# Patient Record
Sex: FEMALE | Race: WHITE
[De-identification: names, ages, dates, MRNs, and addresses within clinical notes are randomized per-mention and may not be internally consistent; named-entity substitution may affect disease eponyms.]

---

## 2019-07-21 NOTE — HP
CIWA Score


Nausea/Vomitin-Mild Nausea/No Vomiting


Muscle Tremors: 4-Moderate,w/Arms Extend


Anxiety: 3


Agitation: 4-Moderately Restless


Paroxysmal Sweats: 2


Orientation: 0-Oriented


Tacttile Disturbances: 0-None


Auditory Disturbances: 0-None


Visual Disturbances: 0-None


Headache: 2-Mild


CIWA-Ar Total Score: 16





- Admission Criteria


[facility] Guidelines: Admission for Medically Managed Detox: 


Requires at least one of the followin. CIWA greater than 12


2. Seizures within the past 24 hours


3. Delirium tremens within the past 24 hours


4. Hallucinations within the past 24 hours


5. Acute intervention needed for co  occurring medical disorder


6. Acute intervention needed for co  occurring psychiatric disorder


7. Severe withdrawal that cannot be handled at a lower level of care (continued


    vomiting, continued diarrhea, abnormal vital signs) requiring intravenous


    medication and/or fluids


8. Pregnancy








Admission ROS [facility]





- [facility]


Chief Complaint: 





Alcohol withdrawal symptoms


Allergies/Adverse Reactions: 


 Allergies











Allergy/AdvReac Type Severity Reaction Status Date / Time


 


amoxicillin Allergy Severe  Verified 19 20:19











History of Present Illness: 





33 years old female with a long history of alcohol dependence is seeking 

admission to detox. Patient has been in previous detox at [facility] and reports 7 years of sobriety. She has history of seizure and 

depression.  She denies suicide attempt / suicidal ideation at this time. this 

her first admission to [facility] detox.  


Exam Limitations: No Limitations





- Ebola screening


Have you traveled outside of the country in the last 21 days: No (N)


Have you had contact with anyone from an Ebola affected area: No


Do you have a fever: No





- Review of Systems


Constitutional: Chills, Malaise


EENT: reports: Nose Congestion


Respiratory: reports: No Symptoms reported


Cardiac: reports: No Symptoms Reported


GI: reports: Poor Appetite, Poor Fluid Intake, Abdominal cramping


: reports: No Symptoms Reported


Musculoskeletal: reports: Back Pain


Integumentary: reports: Dryness, Flushing


Neuro: reports: Headache, Tremors


Endocrine: reports: No Symptoms Reported


Hematology: reports: No Symptoms Reported


Psychiatric: reports: Mood/Affect Appropiate, Orientated x3, Anxious, Depressed


Other Systems: Reviewed and Negative





Patient History





- Patient Medical History


Hx Anemia: No


Hx Asthma: No


Hx Chronic Obstructive Pulmonary Disease (COPD): No


Hx Cancer: No


Hx Cardiac Disorders: No


Hx Congestive Heart Failure: No


Hx Hypertension: No


Hx Hypercholesterolemia: No


Hx Pacemaker: No


HX Cerebrovascular Accident: No


Hx Seizures: Yes (Not on medication)


Hx Dementia: No


Hx Diabetes: No


Hx Gastrointestinal Disorders: No


Hx Liver Disease: No


Hx Genitourinary Disorders: No


Hx Sexually Transmitted Disorders: No


Hx Renal Disease (ESRD): No


Hx Thyroid Disease: No


Hx Human Immunodeficiency Virus (HIV): No (Neggative 2019)


Hx Hepatitis C: No


Hx Depression: Yes (Effexor, Bupriopion)


Hx Suicide Attempt: No (Denies suicide attempt/ ze1merfav ideation at this time)


Hx Bipolar Disorder: No


Hx Schizophrenia: No





- Patient Surgical History


Past Surgical History: No





- PPD History


Previous Implant?: Yes


Documented Results: Negative w/o proof


Implanted On Prior [facility] Admission?: No


PPD to be Administered?: Yes





- Reproductive History


Patient is a Female of Child Bearing Age (11 -55 yrs old): Yes


Last Menstrual Period: 19


Patient Pregnant: No





- Smoking Cessation


Smoking history: Never smoked


Have you smoked in the past 12 months: No


Hx Chewing Tobacco Use: No


Initiated information on smoking cessation: No





- Substance & Tx. History


Hx Alcohol Use: Yes


Hx Substance Use: No


Substance Use Type: Alcohol


Hx Substance Use Treatment: No





- Substances abused


  ** Alcohol


Substance route: Oral


Frequency: Daily


Amount used: 1 pint


Age of first use: 14


Date of last use: 19





Family Disease History





- Family Disease History


Family Disease History: Other: Mother (Alcoholic)





Admission Physical Exam [facility]





- Vital Signs


Vital Signs: 


 Vital Signs - 24 hr











  19





  20:19


 


Temperature 98.6 F


 


Pulse Rate 117 H


 


Respiratory 20





Rate 


 


Blood Pressure 128/87














- Physical


General Appearance: Yes: Moderate Distress, Anxious


HEENTM: Yes: Within Normal Limits, Normal ENT Inspection, Normal Voice


Respiratory: Yes: Normal Breath Sounds


Neck: Yes: Supple


Breast: Yes: Breast Exam Deferred


Cardiology: Yes: Regular Rhythm, Regular Rate


Abdominal: Yes: Normal Bowel Sounds


Genitourinary: Yes: Within Normal Limits


Back: Yes: Normal Inspection


Musculoskeletal: Yes: Within Normal Limits


Extremities: Yes: Normal Inspection


Neurological: Yes: CNs II-XII NML intact


Integumentary: Yes: Warm


Lymphatic: Yes: Within Normal Limits





- Diagnostic


(1) Alcohol dependence with uncomplicated withdrawal


Current Visit: Yes   Status: Acute   





(2) Depression


Current Visit: Yes   Status: Acute   


Qualifiers: 


   Depression Type: unspecified   Qualified Code(s): F32.9 - Major depressive 

disorder, single episode, unspecified   





Cleared for Admission [facility]





- Detox or Rehab


[facility] Level of Care: Medically Managed


Detox Regimen/Protocol: Librium





Breathalyzer





- Breathalyzer


Breathalyzer: 0.105





Urine Drug Screen





- Test Device


Lot number: [id number]


Expiration date: 21





- Control


Is test valid?: Yes





- Results


Drug screen NEGATIVE: No


Urine drug screen results: BZO-Benzodiazepines





Inpatient Rehab Admission





- Rehab Decision to Admit


Inpatient rehab admission?: No

## 2019-07-21 NOTE — PDOC
History of Present Illness





- General


Chief Complaint: Alcohol intoxication


Stated Complaint: SUBSTANCE ABUSE


Time Seen by Provider: 07/21/19 16:48


History Source: Patient


Exam Limitations: No Limitations





- History of Present Illness


Initial Comments: 





07/21/19 17:25





39 yo F pmh etoh withdrawal (DT, seizures) BIBEMS for alcohol intoxication. Pt 

called EMS after binge drinking for the weekend. Pt is a poor historian but 

believes she has had about 20 drinks. Last drink at 3pm. Pt states she normally 

drinks the same amount per day. Denies concominant drug use (e.g. barbituates, 

benzos). Denies head strike, LOC, f/c/n/v, chest pain, sob. 





Chart review shows the patient recently had an MRI (indication seizures) and 

was seen for etoh detox but signed out. 





PMH: as above 


Meds: see chart 


No significant surgical hx


Amoxicillin allergy 





Does not smoke. 














Past History





- Past Medical History


Allergies/Adverse Reactions: 


 Allergies











Allergy/AdvReac Type Severity Reaction Status Date / Time


 


amoxicillin Allergy Severe  Verified 07/21/19 20:19











Home Medications: 


Ambulatory Orders





Bupropion HCl [Bupropion Xl] 450 mg PO DAILY 07/17/19 


Venlafaxine HCl ER [Effexor Xr -] 75 mg PO DAILY 07/17/19 








COPD: No


Other medical history: chr alcoholic





- Suicide/Smoking/Psychosocial Hx


Smoking History: Never smoked


Hx Alcohol Use: Yes


Drug/Substance Use Hx: No





**Review of Systems





- Review of Systems


Able to Perform ROS?: Yes


Is the patient limited English proficient: No


Constitutional: No: Chills, Fever


HEENTM: No: Symptoms Reported, See HPI, Eye Pain, Blurred Vision, Tearing, 

Recent change in vision, Double Vision, Cataracts, Ear Pain, Ocular Prothesis, 

Ear Discharge, Nose Pain, Nose Congestion, Tinnitus, Nose Bleeding, Hearing Loss

, Throat Pain, Throat Swelling, Mouth Pain, Dental Problems, Difficulty 

Swallowing, Mouth Swelling, Other


Respiratory: No: Symptoms reported, See HPI, Cough, Orthopnea, Shortness of 

Breath, SOB with Exertion, SOB at Rest, Stridor, Wheezing, Productive cough, 

Hemoptysis, Other


Cardiac (ROS): No: Symptoms Reported, See HPI, Chest Pain, Edema, Irregular 

Heart Rate, Lightheadedness, Palpitations, Syncope, Chest Tightness, Other


ABD/GI: No: Symptoms Reported, See HPI, Abdominal Distended, Abd. Pain w/ 

defecation, Blood Streaked Bowels, Constipated, Diarrhea, Difficulty Swallowing

, Nausea, Poor Appetite, Poor Fluid Intake, Rectal Bleeding, Vomiting, 

Indigestion, Abdominal cramping, Tarry Stools, Other


: No: Symptoms Reported, See HPI, Burning, Dysuria, Discharge, Frequency, 

Flank Pain, Hematuria, Incontinence, Pain, Urgency, Testicular Mass, Testicular 

Swelling, Lesions, Testicular Pain, Other


Neurological: No: Numbness, Paresthesia, Tingling, Weakness





*Physical Exam





- Vital Signs


 Last Vital Signs











Temp Pulse Resp BP Pulse Ox


 


 98.8 F   118 H  18   121/70   96 


 


 07/21/19 16:45  07/21/19 16:45  07/21/19 16:45  07/21/19 16:45  07/21/19 16:45














- Physical Exam


Comments: 





07/21/19 18:06





GEN: Resting comfortable in bed, NAD. Warm. 


HEENT: NC/AT, EOMI, PERRLA. Moist mucus membranes. CN II-XII intact. No obvious 

deformities, scalp hematomas, palpable fractures, or bruises. Normal voice. 


NEURO: Alert and oriented x3. Sensation grossly intact. 5/5 strength of UE and 

LE b/l. Normal gait, ambulates well. 


CV: S1/S2, RRR, no m/r/g


LUNG: CTAB, no wheezes, crackles, rales, rhonchi. 


GI: soft, ndnt, +BS


EXTREMITIES: There is a 10cm x 5 cm bruise on the lateral aspect of the left 

thigh. No obvious deformities of all extremities. 

















ED Treatment Course





- LABORATORY


CBC & Chemistry Diagram: 


 07/21/19 17:56





 07/21/19 17:56





Medical Decision Making





- Medical Decision Making





07/21/19 17:37





39 yo F pmh alcohol withdrawal (seizures, DTs) BIBEMS for alcohol intoxication. 

Last drink was 2 hours before presentation. Patient is completely alert and 

oriented, neurologically intact. She denies head trauma and there are no signs 

of head trauma. There are no airway or breathing issues.  Patient signed out of 

etoh detox recently but is amenable to re-admission. 





Acute alcohol intoxication


- CBC, CMP, pregnancy test


- IV, banana bag


- Urine tox


- monitor 


- [facility] has a bed for patient; call before discharge. If no bed anymore, 

need to find new detox facility ([place]?) 





dispo: [facility] 














*DC/Admit/Observation/Transfer


Diagnosis at time of Disposition: 


Alcohol intoxication


Qualifiers:


 Complication of substance-induced condition: uncomplicated Qualified Code(s): 

F10.920 - Alcohol use, unspecified with intoxication, uncomplicated








- Discharge Dispostion


Disposition: TRANSFER ACUTE CARE/OTHER HOSP


Condition at time of disposition: Good


Decision to Admit order: No





- Referrals





- Patient Instructions


Printed Discharge Instructions:  DI for Alcohol Abuse


Additional Instructions: 


You were assessed and treated in the Emergency Department. 


Please follow up with your Primary Care Physician and Detox specialist. 





Please return to the Emergency Department if you experience any of the following

: 


- chest pain 


- shortness of breath and/or difficulty breathing 


- seizures, fainting, loss of consciousness, change in behavior, and/or change 

in mentation


- severe headache


- any signs or symptoms that concern you 








- Post Discharge Activity

## 2019-07-21 NOTE — PDOC
Documentation entered by Maxime Potts SCRIBE, acting as scribe for 

Isidra Casey MD.








Isidra Casey MD:  This documentation has been prepared by the Katlyn fletcher Nirvannie, SCRIBE, under my direction and personally reviewed by me in 

its entirety.  I confirm that the documentation accurately reflects all work, 

treatment, procedures, and medical decision making performed by me.  





Attending Attestation





- Resident


Resident Name: Ramo Jara





- ED Attending Attestation


I have performed the following: I have examined & evaluated the patient, The 

case was reviewed & discussed with the resident, I agree w/resident's findings 

& plan





- HPI


HPI: 





07/21/19 17:57


The patient is a 38 year old female, with a significant past medical history of 

alcohol abuse (withdrawal seizures), who presents to the emergency department 

with, alcohol intoxication. Patient believes she had approximately 20 drinks 

this weekend (last drink 3pm). She denies recent chest pain or shortness of 

breath.





Allergies: Amoxicillin.





07/21/19 18:15


cant remember last time she was sober, denies other drug use. states things are 

safe at home denies trauma. denies thoughts of self harm. 








- Physicial Exam


PE: 





07/21/19 18:15


awake alert lungs clear bilat heart rrr no mrg abd soft nt nd ext wwp. nuero 

alert oriented x 3. moves all four ext.  speech clear. 





- Medical Decision Making





07/21/19 18:16


39 yo F with h/o etoh abuse last etoh today at 3 pm. pt willing to go to [facility] for detox. plan to transfer to [facility]. basic labs. po hydration CIWA 

precautions. 


07/21/19 19:14


pt ekg normal. would like to go to [facility]. clinically sober at this point . 

called [facility] to discuss transfer.





**Heart Score/ECG Review


  ** #1


General ECG Interpretation: Sinus Rhythm, Normal Rate (108), Normal Intervals, 

No acute ischemic changes

## 2019-07-21 NOTE — PDOC
*Physical Exam





- Vital Signs


 Last Vital Signs











Temp Pulse Resp BP Pulse Ox


 


 98.8 F   118 H  18   121/70   96 


 


 07/21/19 16:45  07/21/19 16:45  07/21/19 16:45  07/21/19 16:45  07/21/19 16:45














ED Treatment Course





- LABORATORY


CBC & Chemistry Diagram: 


 07/21/19 17:56





 07/21/19 17:56





Medical Decision Making





- Medical Decision Making





07/21/19 17:16


Patient seen and evaluated with Dr. Jara


37 y/o female with seizure disorder presents with alcohol intoxication.  

Patient states she was at home when she decided to come to the hospital


States she drinks 3 pints of vodka daily.  Denies any fall, head trauma/LOC.  

Amenable to detox.


10 point ROS is negative including no chest pain/shortness of breath/abdominal 

pain/nausea/vomiting/diarrhea/constipation/dysuria/hematuria/numbness/tingling





As per EMR patient left AMA from [facility] on 07/17/2017.  


Plan to observe until clinically sober + symptomatic treatment with IV hydration

,and d/c to [facility] pending bed availability.





07/21/19 17:51


Female alcohol detox bed available at [facility]


Will obtain baseline labs, EKG, CXR, Urine Tox for [facility] admission








*DC/Admit/Observation/Transfer


Diagnosis at time of Disposition: 


Alcohol intoxication


Qualifiers:


 Complication of substance-induced condition: uncomplicated Qualified Code(s): 

F10.920 - Alcohol use, unspecified with intoxication, uncomplicated








- Referrals





- Patient Instructions





- Post Discharge Activity

## 2019-07-22 NOTE — CONSULT
[facility] Psychiatric Consult





- Data


Date of interview: 07/22/19


Admission source: [facility] ED


Identifying data: MS Mckinney is a 38 years old single  female, 

unemployed with no source of income, homeless seeking detox treatment for 

alcohol


Substance Abuse History: Reports history of alcohol use. [facility] 

counsel[facility]'s summary for further information


Medical History: Significant for history one seizure episode. Patient claims 

that it was due to dehydration


Psychiatric History: Reports that her first psychiatric contact was 10 years 

ago when she saw a private psychatrist in [place] who started her on Prozac 

for depression. Reports that she has been the psychiatrist regularly. She is 

currently precribed Wellbutrin  mg/day and Effexor XR 75 mg/day.  Told 

writer that she last took medications a week ago. Denies previous psychiatric 

hospitalization or suicidal attempt. At present, reports feeling depressed, 

anxious and sleeping poorly


Physical/Sexual Abuse/Trauma History: Denies history of emotional, physical or 

sexual abuse as well as DV relationship


Additional Comment: Reports history of one previous arrest on charges of DWI





Mental Status Exam





- Mental Status Exam


Alert and Oriented to: Time, Place, Person


Cognitive Function: Fair


Patient Appearance: Well Groomed


Mood: Depressed, Anxious


Patient Behavior: Cooperative


Speech Pattern: Clear


Voice Loudness: Normal


Thought Process: Intact, Goal Oriented


Thought Disorder: Not Present


Hallucinations: Denies


Suicidal Ideation: Denies


Homicidal Ideation: Denies


Insight/Judgement: Poor


Sleep: Poorly


Appetite: Fair


Muscle strength/Tone: Normal


Gait/Station: Normal





Psychiatric Findings





- Problem List (Axis 1, 2,3)


(1) MDD (major depressive disorder)


Current Visit: Yes   Status: Chronic   





(2) Alcohol-induced mood disorder


Current Visit: Yes   Status: Acute   





(3) Alcohol-induced sleep disorder


Current Visit: Yes   Status: Acute   





(4) Alcohol dependence with uncomplicated withdrawal


Current Visit: Yes   Status: Acute   





(5) History of seizure


Current Visit: Yes   Status: Resolved   





- Initial Treatment Plan


Initial Treatment Plan: 1) Resume Wellbutrin  mg po daily and Effexor XR 

75 mg po daily.  2) Patient educated about relationship between medication 

especially Wellbutrin and Seizure and he was invited to initiate a conversation 

with her health care provider about that.  3)Continue inpatient detoxification

## 2019-07-22 NOTE — EKG
Test Reason : 

Blood Pressure : ***/*** mmHG

Vent. Rate : 097 BPM     Atrial Rate : 097 BPM

   P-R Int : 142 ms          QRS Dur : 074 ms

    QT Int : 322 ms       P-R-T Axes : 069 073 071 degrees

   QTc Int : 408 ms

 

NORMAL SINUS RHYTHM

NORMAL ECG

NO PREVIOUS ECGS AVAILABLE

Confirmed by YISSEL SAEED MD ([id number]) on 7/22/2019 9:25:22 AM

 

Referred By: SRIDEVI PROCTOR           Confirmed By:YISSEL SAEED MD

## 2019-07-22 NOTE — EKG
Test Reason : 

Blood Pressure : ***/*** mmHG

Vent. Rate : 108 BPM     Atrial Rate : 108 BPM

   P-R Int : 144 ms          QRS Dur : 076 ms

    QT Int : 312 ms       P-R-T Axes : 067 074 066 degrees

   QTc Int : 418 ms

 

SINUS TACHYCARDIA

CANNOT RULE OUT ANTERIOR INFARCT , AGE UNDETERMINED

ABNORMAL ECG

NO PREVIOUS ECGS AVAILABLE

Confirmed by YISSEL SAEED MD ([id number]) on 7/22/2019 9:24:57 AM

 

Referred By:             Confirmed By:YISSEL SAEED MD

## 2019-07-22 NOTE — PN
S CIWA





- CIWA Score


Nausea/Vomitin-No Nausea/No Vomiting


Muscle Tremors: 3


Anxiety: 3


Agitation: 3


Paroxysmal Sweats: 3


Orientation: 0-Oriented


Tacttile Disturbances: 0-None


Auditory Disturbances: 0-None


Visual Disturbances: 0-None


Headache: 0-None Present


CIWA-Ar Total Score: 12





[facility] Progress Note (SOAP)


Subjective: 





sweats


shakes


interrupted sleep


body aches


anxiety





Objective: 





19 11:20


 Vital Signs











Temperature  98.2 F   19 09:26


 


Pulse Rate  107 H  19 11:00


 


Respiratory Rate  16   19 09:26


 


Blood Pressure  102/58 L  19 09:26


 


O2 Sat by Pulse Oximetry (%)      








 Laboratory Tests











  19





  20:49


 


POC Urine HCG, Qual  Negative








labs pending


aaox3


ambulating


no acute distress


Assessment: 





19 11:20


withdrawal sx


Plan: 





continue with detox


increase fluids


pending labs

## 2019-07-23 NOTE — PN
[facility] CIWA





- CIWA Score


Nausea/Vomitin-No Nausea/No Vomiting


Muscle Tremors: 3


Anxiety: 3


Agitation: 3


Paroxysmal Sweats: 2


Orientation: 0-Oriented


Tacttile Disturbances: 0-None


Auditory Disturbances: 0-None


Visual Disturbances: 0-None


Headache: 0-None Present


CIWA-Ar Total Score: 11





[facility] Progress Note (SOAP)


Subjective: 





sweats


anxiety


body aches


interrupted sleep


Objective: 





19 11:44


 Vital Signs











Temperature  97.7 F   19 07:21


 


Pulse Rate  65   19 07:21


 


Respiratory Rate  16   19 07:21


 


Blood Pressure  106/68   19 07:21


 


O2 Sat by Pulse Oximetry (%)      








 Laboratory Tests











  19





  20:49 07:00 07:00


 


WBC   7.4 


 


RBC   3.88 


 


Hgb   11.3 


 


Hct   34.9 


 


MCV   89.9 


 


MCH   29.3 


 


MCHC   32.5 


 


RDW   14.4 


 


Plt Count   377  D 


 


MPV   8.5 


 


Sodium    139


 


Potassium    4.1


 


Chloride    107


 


Carbon Dioxide    27


 


Anion Gap    5 L


 


BUN    14.5


 


Creatinine    0.7


 


Est GFR (CKD-EPI)AfAm    128.28


 


Est GFR (CKD-EPI)NonAf    110.69


 


Random Glucose    89


 


Calcium    8.4 L


 


Total Bilirubin    0.3


 


AST    26


 


ALT    32


 


Alkaline Phosphatase    73


 


Total Protein    6.3 L


 


Albumin    3.3 L


 


POC Urine HCG, Qual  Negative  


 


RPR Titer   














  19





  07:00


 


WBC 


 


RBC 


 


Hgb 


 


Hct 


 


MCV 


 


MCH 


 


MCHC 


 


RDW 


 


Plt Count 


 


MPV 


 


Sodium 


 


Potassium 


 


Chloride 


 


Carbon Dioxide 


 


Anion Gap 


 


BUN 


 


Creatinine 


 


Est GFR (CKD-EPI)AfAm 


 


Est GFR (CKD-EPI)NonAf 


 


Random Glucose 


 


Calcium 


 


Total Bilirubin 


 


AST 


 


ALT 


 


Alkaline Phosphatase 


 


Total Protein 


 


Albumin 


 


POC Urine HCG, Qual 


 


RPR Titer  Nonreactive








aaox3


ambulating


no acute distress


Assessment: 





19 11:44


withdrawal sx


Plan: 





continue detox


increase fluids

## 2019-07-24 NOTE — PN
[facility] CIWA





- CIWA Score


Nausea/Vomitin-No Nausea/No Vomiting


Muscle Tremors: 2


Anxiety: 2


Agitation: 1-Slight > Activity


Paroxysmal Sweats: 2


Orientation: 0-Oriented


Tacttile Disturbances: 0-None


Auditory Disturbances: 0-None


Visual Disturbances: 0-None


Headache: 0-None Present


CIWA-Ar Total Score: 7





[facility] Progress Note (SOAP)


Subjective: 





sweats


tired


Objective: 





19 12:45


 Vital Signs











Temperature  97.7 F   19 09:24


 


Pulse Rate  80   19 09:24


 


Respiratory Rate  18   19 09:24


 


Blood Pressure  103/60   19 09:24


 


O2 Sat by Pulse Oximetry (%)      








aaox3


ambulating


no acute distress


Assessment: 





19 12:46


mild withdrawal sx


Plan: 





continue detox


increase fluids

## 2019-07-25 NOTE — PN
S CIWA





- CIWA Score


Nausea/Vomitin-No Nausea/No Vomiting


Muscle Tremors: 1-None Visible, but Felt


Anxiety: 1-Mildly Anxious


Agitation: 1-Slight > Activity


Paroxysmal Sweats: No Perspiration


Orientation: 0-Oriented


Tacttile Disturbances: 0-None


Auditory Disturbances: 0-None


Visual Disturbances: 0-None


Headache: 0-None Present


CIWA-Ar Total Score: 3





[facility] Progress Note (SOAP)


Subjective: 





little anxiety


Objective: 





19 11:22


 Vital Signs











Temperature  97.9 F   19 09:35


 


Pulse Rate  86   19 09:35


 


Respiratory Rate  18   19 09:35


 


Blood Pressure  114/69   19 09:35


 


O2 Sat by Pulse Oximetry (%)      








aaox3


ambulating


no acute distress


Assessment: 





19 11:23


mild withdrawal sx


Plan: 





continue detox


d/c in am

## 2019-07-26 NOTE — DS
[facility] Detox Discharge Summary


Admission Date: 


07/21/19





Discharge Date: 07/26/19





- History


Present History: Alcohol Dependence





- Physical Exam Results


Vital Signs: 


 Vital Signs











Temperature  97.1 F L  07/26/19 06:22


 


Pulse Rate  72   07/26/19 06:22


 


Respiratory Rate  16   07/26/19 06:22


 


Blood Pressure  101/58 L  07/26/19 06:22


 


O2 Sat by Pulse Oximetry (%)      











Pertinent Admission Physical Exam Findings: 





pt arrived in withdrawals


 Laboratory Tests











  07/21/19 07/22/19 07/22/19





  20:49 07:00 07:00


 


WBC   7.4 


 


RBC   3.88 


 


Hgb   11.3 


 


Hct   34.9 


 


MCV   89.9 


 


MCH   29.3 


 


MCHC   32.5 


 


RDW   14.4 


 


Plt Count   377  D 


 


MPV   8.5 


 


Sodium    139


 


Potassium    4.1


 


Chloride    107


 


Carbon Dioxide    27


 


Anion Gap    5 L


 


BUN    14.5


 


Creatinine    0.7


 


Est GFR (CKD-EPI)AfAm    128.28


 


Est GFR (CKD-EPI)NonAf    110.69


 


Random Glucose    89


 


Calcium    8.4 L


 


Total Bilirubin    0.3


 


AST    26


 


ALT    32


 


Alkaline Phosphatase    73


 


Total Protein    6.3 L


 


Albumin    3.3 L


 


POC Urine HCG, Qual  Negative  


 


RPR Titer   


 


TB (QFT) Incubation   


 


TB Test (QFT) Nil   


 


TB Test (QFT) Mitogen   


 


TB Test (QFT) Antigen   


 


TB Test (QFT)   


 


TB Positive Criteria   














  07/22/19 07/22/19





  07:00 07:00


 


WBC  


 


RBC  


 


Hgb  


 


Hct  


 


MCV  


 


MCH  


 


MCHC  


 


RDW  


 


Plt Count  


 


MPV  


 


Sodium  


 


Potassium  


 


Chloride  


 


Carbon Dioxide  


 


Anion Gap  


 


BUN  


 


Creatinine  


 


Est GFR (CKD-EPI)AfAm  


 


Est GFR (CKD-EPI)NonAf  


 


Random Glucose  


 


Calcium  


 


Total Bilirubin  


 


AST  


 


ALT  


 


Alkaline Phosphatase  


 


Total Protein  


 


Albumin  


 


POC Urine HCG, Qual  


 


RPR Titer  Nonreactive 


 


TB (QFT) Incubation   


 


TB Test (QFT) Nil   0.02


 


TB Test (QFT) Mitogen   >10.00


 


TB Test (QFT) Antigen   0.02


 


TB Test (QFT)   Negative


 


TB Positive Criteria   








today pt is aaox3


ambulating


no acute distress


no s/s of withdrawals





- Treatment


Hospital Course: Detox Protocol Followed, Detoxed Safely, Responded well, 

Discharged Condition Good, Rehab Referral Accepted


Patient has Accepted a Rehab Referral to: pt referred to [facility] 

inpatient rehab





- Medication


Discharge Medications: 


Ambulatory Orders





Bupropion HCl [Bupropion Xl] 450 mg PO DAILY 07/17/19 


Venlafaxine HCl ER [Effexor Xr -] 75 mg PO DAILY 07/17/19 











- Diagnosis


(1) Alcohol dependence with uncomplicated withdrawal


Current Visit: Yes   Status: Chronic   





(2) Alcohol-induced mood disorder


Current Visit: Yes   Status: Acute   





(3) Alcohol-induced sleep disorder


Current Visit: Yes   Status: Acute   





(4) Depression


Current Visit: Yes   Status: Acute   


Qualifiers: 


   Depression Type: unspecified   Qualified Code(s): F32.9 - Major depressive 

disorder, single episode, unspecified   





(5) MDD (major depressive disorder)


Current Visit: Yes   Status: Chronic   





(6) History of seizure


Current Visit: Yes   Status: Resolved   





- AMA


Did Patient Leave Against Medical Advice: No (referred to [place] inpatient rehab

)

## 2019-12-11 ENCOUNTER — HOSPITAL ENCOUNTER (INPATIENT)
Dept: HOSPITAL 74 - YASAS | Age: 39
LOS: 4 days | Discharge: HOME | End: 2019-12-15
Attending: ALLERGY & IMMUNOLOGY | Admitting: ALLERGY & IMMUNOLOGY
Payer: COMMERCIAL

## 2019-12-11 ENCOUNTER — HOSPITAL ENCOUNTER (EMERGENCY)
Dept: HOSPITAL 74 - JER | Age: 39
Discharge: ANOTHER HOSPITAL | End: 2019-12-11
Payer: COMMERCIAL

## 2019-12-11 VITALS — TEMPERATURE: 98 F | DIASTOLIC BLOOD PRESSURE: 76 MMHG | SYSTOLIC BLOOD PRESSURE: 114 MMHG | HEART RATE: 95 BPM

## 2019-12-11 VITALS — BODY MASS INDEX: 20.3 KG/M2

## 2019-12-11 VITALS — BODY MASS INDEX: 19.5 KG/M2
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
## 2019-12-11 DIAGNOSIS — F19.24: ICD-10-CM

## 2019-12-11 DIAGNOSIS — Z59.0: ICD-10-CM

## 2019-12-11 DIAGNOSIS — F32.9: ICD-10-CM

## 2019-12-11 DIAGNOSIS — Z88.8: ICD-10-CM
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
## 2019-12-11 DIAGNOSIS — Z86.69: ICD-10-CM
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
## 2019-12-11 DIAGNOSIS — F10.280: ICD-10-CM

## 2019-12-11 DIAGNOSIS — R56.9: ICD-10-CM

## 2019-12-11 DIAGNOSIS — Z04.3: Primary | ICD-10-CM

## 2019-12-11 DIAGNOSIS — H05.229: ICD-10-CM

## 2019-12-11 DIAGNOSIS — F10.10: ICD-10-CM

## 2019-12-11 DIAGNOSIS — F10.230: Primary | ICD-10-CM

## 2019-12-11 PROCEDURE — HZ2ZZZZ DETOXIFICATION SERVICES FOR SUBSTANCE ABUSE TREATMENT: ICD-10-PCS | Performed by: ALLERGY & IMMUNOLOGY

## 2019-12-11 RX ADMIN — Medication SCH MG: at 22:21

## 2019-12-11 SDOH — ECONOMIC STABILITY - HOUSING INSECURITY: HOMELESSNESS: Z59.0

## 2019-12-11 NOTE — PDOC
Documentation entered by Felipa Davis SCRIBE, acting as scribe for Vasu Hahn MD.








Vasu Hahn MD:  This documentation has been prepared by the Ryan fletcher Adrianna, SCRIBE, under my direction and personally reviewed by me in its 

entirety.  I confirm that the documentation accurately reflects all work, 

treatment, procedures, and medical decision making performed by me.  





Attending Attestation





- Resident


Resident Name: Sultana Cortés





- ED Attending Attestation


I have performed the following: I have examined & evaluated the patient, The 

case was reviewed & discussed with the resident, I agree w/resident's findings 

& plan, Exceptions are as noted





- HPI


HPI: 


The patient is a 39 year old female, with a significant PMH of EtOH abuse, who 

presents to the ED for evaluation s/p witnessed fall at [facility]. Patient 

slipped on someones vomit while at [facility], causing her to fall backwards 

and hit the back of her head on the concrete floor. She was able to ambulate 

after the fall. She endorses a mild headache while in the ED. 





Allergies: Amoxicillin 


Surgical History: None reported


Social History: EtOH abuse (last drink was this morning). No tobacco, or 

illicit drug use 





- Physicial Exam


PE: 





12/11/19 15:13


Patient is awake and alert, well-nourished, in no distress





Normocephalic, no obvious deformity, minimal soft tissue swelling to the right 

occipital area without bony crepitus


Neck is supple, no midline tenderness, no deformity, full range of motion


CTA


RRR


Pelvis is stable


Cranial nerves II through XII grossly intact; motor is five 5 x 4





- Medical Decision Making





12/11/19 15:16


39-year-old female with history of EtOH abuse presents with evidence of closed 

head injury status post fall.  Will obtain CT of head.  No indication for 

cervical spine CT.  Likely discharge.

## 2019-12-11 NOTE — FALL
Fall Exam





- Event


Witnessed fall: No


Location of Fall: [facility]


Fall from: While ambulating





- Pre-Fall


Mental Status: Alert (Unclear)


Current Medications: 





12/11/19 13:52


Unknown





- Post-Fall


Patient Outcome: Abrasion/Bruise (Swelling on L forehead and R occiput)


Treatment: Ice Pack


LOC Post-Fall: Alert, Oriented


Identify factors for HIGH RISK for Head Injury: Known to have hit head

## 2019-12-11 NOTE — PDOC
History of Present Illness





- General


Chief Complaint: Injury


Stated Complaint: 2X HEAD INJURY,INTOX


Time Seen by Provider: 12/11/19 14:21


History Source: Patient





- History of Present Illness


Initial Comments: 





12/11/19 14:37





Patient is a 39 year old female with no significant PMH who presents from [facility] detox s/p witnessed fall. Pt was checking herself into rehab after 

relapsing from 3-months sobriety last night. Last alcoholic drink was 8am this 

morning. At [facility], she slipped on someone's vomit, fell backwards, and hit 

the back of her head on the concrete. Denies LOC. No symptoms prior to fall (

denies chest pain, SOB, palpitations, lightheadedness). After fall, pt was able 

to stand right up. No vision changes, dizziness, confusion. Denies neck pain or 

tenderness. Only complaint is mild headache on back of head where she hit.











12/11/19 14:38








Past History





- Past Medical History


Allergies/Adverse Reactions: 


 Allergies











Allergy/AdvReac Type Severity Reaction Status Date / Time


 


amoxicillin Allergy Severe  Verified 12/11/19 14:00











Home Medications: 


Ambulatory Orders





Bupropion HCl [Bupropion Xl] 450 mg PO DAILY 07/17/19 


Venlafaxine HCl ER [Effexor Xr -] 75 mg PO DAILY 07/17/19 








Anemia: No


Asthma: No


Cancer: No


Cardiac Disorders: No


CVA: No


COPD: No


CHF: No


Dementia: No


Diabetes: No


GI Disorders: No


 Disorders: No


HTN: No


Hypercholesterolemia: No


Kidney Stones: No


Liver Disease: No


Psychiatric Problems: Yes (DEPERESSION)


Seizures: Yes (Not on medication)


Thyroid Disease: No





- Surgical History


Abdominal Surgery: No


Appendectomy: No


Cardiac Surgery: No


Cholecystectomy: No


Lung Surgery: No


Neurologic Surgery: No


Orthopedic Surgery: No





- Reproductive History


PID: No





- Psycho Social/Smoking Cessation Hx


Smoking History: Never smoked


Have you smoked in the past 12 months: No


Hx Alcohol Use: Yes


Drug/Substance Use Hx: No


Substance Use Type: Alcohol


Hx Substance Use Treatment: No





*Physical Exam





- Vital Signs


 Last Vital Signs











Temp Pulse Resp BP Pulse Ox


 


 98 F   95 H  18   114/76   98 


 


 12/11/19 13:55  12/11/19 13:55  12/11/19 13:55  12/11/19 13:55  12/11/19 13:55














Medical Decision Making





- Medical Decision Making





12/11/19 15:29





CT Head: no acute infarction, hemorrhage or fracture.





Pt is sober.





She is medically stable to be discharged back to [facility].  


12/11/19 15:33








Discharge





- Discharge Information


Clinical Impression/Diagnosis: 


Fall


Qualifiers:


 Encounter type: initial encounter Qualified Code(s): W19.XXXA - Unspecified 

fall, initial encounter





Condition: Stable


Disposition: HOME





- Admission


No





- Follow up/Referral





- Patient Discharge Instructions


Additional Instructions: 


You were evaluated in the ER for a fall. A CAT scan of your head did not show 

any fractures or bleeds.





You are stable to be discharged back to [facility] to complete rehab.





Please return to the ER if you experience subsequent falls, worsening headache, 

dizziness, confusion. 





- Post Discharge Activity

## 2019-12-11 NOTE — HP
CIWA Score


Nausea/Vomitin-No Nausea/No Vomiting


Muscle Tremors: 4-Moderate,w/Arms Extend


Anxiety: 4-Mod. Anxious/Guarded


Agitation: 4-Moderately Restless


Paroxysmal Sweats: 3


Orientation: 0-Oriented


Tacttile Disturbances: 0-None


Auditory Disturbances: 0-None


Visual Disturbances: 3-Moderate Sensitivity (to light)


Headache: 2-Mild


CIWA-Ar Total Score: 20





- Admission Criteria


OASAS Guidelines: Admission for Medically Managed Detox: 


Requires at least one of the followin. CIWA greater than 12


2. Seizures within the past 24 hours


3. Delirium tremens within the past 24 hours


4. Hallucinations within the past 24 hours


5. Acute intervention needed for co  occurring medical disorder


6. Acute intervention needed for co  occurring psychiatric disorder


7. Severe withdrawal that cannot be handled at a lower level of care (continued


    vomiting, continued diarrhea, abnormal vital signs) requiring intravenous


    medication and/or fluids


8. Pregnancy





Patient presents the following: CIWA greater than 12


Admission Criteria Met: Admission criteria met





Admitting History and Physical





- Past Medical History


...LMP: 19





- Smoking History


Smoking history: Never smoked


Have you smoked in the past 12 months: No





- Alcohol/Substance Use


Hx Alcohol Use: Yes





Admission ROS [facility]





- [facility]


Chief Complaint: 





SEEKING ALCOHOL DETOX


Allergies/Adverse Reactions: 


 Allergies











Allergy/AdvReac Type Severity Reaction Status Date / Time


 


amoxicillin Allergy Severe  Verified 19 14:00











History of Present Illness: 





HERE FOR ALCOHOL DETOX. CLIENT IS SELF REFERRED. KNOWN TO PROGRAM . LAST HERE 2019. CLIENT REPORTS RELAPSING 3 DAYS AGO. SHE REPORTS DRINKING ABOUT 2 PINTS 

OF VODKA TODAY. LAST INTAKE TODAY, 1PINT. SHE PRESENTS WITH WITHDRAWAL SX'S. 

SHE WAS ALSO EVALUATED AT [facility] [place] AFTER A SLIP AND FALL WITH CLOSED HEAD INJURY. 

CLIENT REPORTS FALLING BACKWARDS AND HITTING BACK OF HER HEAD. CT SCAN OF HEAD 

IS NEGATIVE CLIENT CLEAR FOR DETOX ADMISSION. CLIENT IS + FOR EYE OPENER 

SCREENING, BLACK OUTS, DENIES WITHDRAWAL SZ OR HX/O SZ. MOST RECENT CLEAN TIME 

5 MONTHS. 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








TB GOLD 2019 NEG


CXR 2019 NEG FOR PATHOLOGY


EKG NOTED 2019


Exam Limitations: No Limitations





- Ebola screening


Have you traveled outside of the country in the last 21 days: No


Have you had contact with anyone from an Ebola affected area: No


Have you been sick,other than usual withdrawal symptoms: No


Do you have a fever: No





- Review of Systems


Constitutional: Chills, Night Sweats


EENT: reports: Blurred Vision (CHRONIC)


Respiratory: reports: No Symptoms reported


Cardiac: reports: No Symptoms Reported


GI: reports: Poor Fluid Intake


: reports: No Symptoms Reported


Musculoskeletal: reports: No Symptoms Reported


Integumentary: reports: Flushing


Neuro: reports: Headache, Tremors, Dizziness


Endocrine: reports: No Symptoms Reported


Hematology: reports: No Symptoms Reported


Psychiatric: reports: Orientated x3, Anxious, Depressed (DENIES SI/HI)


Other Systems: Reviewed and Negative





Patient History





- Patient Medical History


Hx Anemia: No


Hx Asthma: No


Hx Chronic Obstructive Pulmonary Disease (COPD): No


Hx Cancer: No


Hx Cardiac Disorders: No


Hx Congestive Heart Failure: No


Hx Hypertension: No


Hx Hypercholesterolemia: No


Hx Pacemaker: No


HX Cerebrovascular Accident: No


Hx Seizures: No


Hx Dementia: No


Hx Diabetes: No


Hx Gastrointestinal Disorders: No


Hx Liver Disease: No


Hx Genitourinary Disorders: No


Hx Sexually Transmitted Disorders: No


Hx Renal Disease (ESRD): No


Hx Thyroid Disease: No


Hx Human Immunodeficiency Virus (HIV): No


Hx Hepatitis C: No


Hx Depression: Yes (Effexor, Bupriopion Has not taken in a couple of days due 

to binge drinking)


Hx Suicide Attempt: No (Denies suicide attempt/ [id number] ideation at this time)


Hx Bipolar Disorder: No


Hx Schizophrenia: No





- Patient Surgical History


Past Surgical History: No


Hx Neurologic Surgery: No


Hx Cataract Extraction: No


Hx Cardiac Surgery: No


Hx Lung Surgery: No


Hx Breast Surgery: No


Hx Breast Biopsy: No


Hx Abdominal Surgery: No


Hx Appendectomy: No


Hx Cholecystectomy: No


Hx Genitourinary Surgery: No


Hx  Section: No


Hx Orthopedic Surgery: No


Anesthesia Reaction: No





- PPD History


Previous Implant?: No


Implanted On Prior R Admission?: No


Date: 19


Results: tb gold neg


PPD to be Administered?: No





- Reproductive History


Patient is a Female of Child Bearing Age (11 -55 yrs old): Yes


Last Menstrual Period: 19


LMP comment: reg


Patient Pregnant: No (neg uhcg)





- Smoking Cessation


Smoking history: Never smoked


Have you smoked in the past 12 months: No


Hx Chewing Tobacco Use: No


Initiated information on smoking cessation: No





- Substance & Tx. History


Hx Alcohol Use: Yes


Hx Substance Use: No


Substance Use Type: Alcohol


Hx Substance Use Treatment: Yes ([facility])





- Substances abused


  ** Alcohol


Substance route: Oral


Frequency: Daily


Amount used: 1 pint OF VODKA


Age of first use: 16


Date of last use: 12/10/19





Admission Physical Exam [facility]





- Vital Signs


Vital Signs: 


 Vital Signs - 24 hr











  19





  01:00 17:40 17:45


 


Temperature 97.8 F 98.4 F 98.4 F


 


Pulse Rate 107 H 98 H 98 H


 


Respiratory 16 18 18





Rate   


 


Blood Pressure 105/68 124/78 124/78














  19





  18:18


 


Temperature 98.4 F


 


Pulse Rate 98 H


 


Respiratory 18





Rate 


 


Blood Pressure 124/78














- Physical


General Appearance: Yes: Moderate Distress, Tremorous, Sweating (flushed), 

Anxious


HEENTM: Yes: EOMI, Normocephalic, Normal Voice, CARMEN, Pharynx Normal


Respiratory: Yes: Chest Non-Tender, Lungs Clear, Normal Breath Sounds, No 

Respiratory Distress, No Accessory Muscle Use


Neck: Yes: No masses,lesions,Nodules, Supple, Trachea in good position


Breast: Yes: Breasts Symetrical


Cardiology: Yes: Regular Rhythm, Regular Rate, S1, S2


Abdominal: Yes: Normal Bowel Sounds, Non Tender, Soft


Genitourinary: Yes: Within Normal Limits


Back: Yes: Normal Inspection


Musculoskeletal: Yes: full range of Motion, Gait Steady


Extremities: Yes: Normal Range of Motion, Non-Tender, Tremors


Neurological: Yes: Fully Oriented, Alert, Motor Strength 5/5


Integumentary: Yes: Dry, Warm, Other (flushed)


Lymphatic: Yes: Within Normal Limits





- Diagnostic


(1) Alcohol-induced mood disorder


Current Visit: Yes   Status: Acute   





(2) Depression


Current Visit: Yes   Status: Chronic   


Qualifiers: 


   Depression Type: unspecified   Qualified Code(s): F32.9 - Major depressive 

disorder, single episode, unspecified   





(3) Fall


Current Visit: Yes   Status: Acute   


Qualifiers: 


   Encounter type: subsequent encounter   Qualified Code(s): W19.XXXD - 

Unspecified fall, subsequent encounter   





(4) Alcohol dependence with uncomplicated withdrawal


Current Visit: Yes   Status: Acute   





(5) Living in shelter


Current Visit: Yes   Status: Acute   





Cleared for Admission [facility]





- Detox or Rehab


[facility] Level of Care: Medically Managed (ativan)


Claeared for Rehab Admission: No





Breathalyzer





- Breathalyzer


Breathalyzer: 0.119





Urine Drug Screen





- Test Device


Lot number: [id number]


Expiration date: 21





- Control


Is test valid?: Yes





- Results


Drug screen NEGATIVE: Yes


Urine drug screen results: BZO-Benzodiazepines





Inpatient Rehab Admission





- Rehab Decision to Admit


Inpatient rehab admission?: No

## 2019-12-11 NOTE — HP
CIWA Score





- Admission Criteria


OASAS Guidelines: Admission for Medically Managed Detox: 


Requires at least one of the followin. CIWA greater than 12


2. Seizures within the past 24 hours


3. Delirium tremens within the past 24 hours


4. Hallucinations within the past 24 hours


5. Acute intervention needed for co  occurring medical disorder


6. Acute intervention needed for co  occurring psychiatric disorder


7. Severe withdrawal that cannot be handled at a lower level of care (continued


    vomiting, continued diarrhea, abnormal vital signs) requiring intravenous


    medication and/or fluids


8. Pregnancy








Admitting History and Physical





- Admission


History of Present Illness: 





Pt is a 38 yo F with PMHx of alcohol use disorder and prior delirium tremens,

depression, found outside [facility] after a fall. Pt was found on floor, per pt 

she said she fell on someone else's vomit on the floor. Pt took 1 pint of vodka 

at 8am today and was coming in for detox at [facility]. No bleeding from any 

orifice, pt alert and responsive but with sustained swelling on L frontal area 

and back area. Pt  reports falling yesterday in [facility] residential services in 

[place], where she sustained a bulge on the L frontal area and the second 

bulge posteriorly was sustained today. She has headache with pain localized to 

the front bump 2/10 and back 4/10. No seizures noted, not tongue biting, no 

fecal or urinary incontinence.


As fall was unwitnessed and pt has obvious head trauma, she will be sent to 

[facility] for CT head.








PE: 


AL- 107


BP-105/68


RR-16





General: Alcohol on breath, awake, initially on floor had to be assisted to 

stand up, no obvious bleeding from any orifice, no vomit on face or mouth, but 

vomit on clothes and floor


HEENT: Golf ball like swelling L frontal/temporal area, fluctuant, non bleeding

, tender, and R posterior swelling golfball size, fluctuant, non bleeding, no 

obvious bleeding tender,PERRL, nose ring in place,


Resp: CTA b/l


Cards: S1, S2, tachycardia


Abdomen: Soft, non tender


CNS: AAOx 3, Normal strength 5/5 globally, normal tone and reflexes, no tremors





Plan: Transfer to [facility] ED for CT head and further eval


For detox at [facility] when discharged from [facility]


 


History Source: Patient





- Past Medical History


...LMP: 19





- Smoking History


Smoking history: Never smoked


Have you smoked in the past 12 months: No





- Alcohol/Substance Use


Hx Alcohol Use: Yes





Admission [facility]





- [facility]


Allergies/Adverse Reactions: 


 Allergies











Allergy/AdvReac Type Severity Reaction Status Date / Time


 


amoxicillin Allergy Severe  Verified 19 20:19














- Ebola screening


Have you traveled outside of the country in the last 21 days: No


Have you had contact with anyone from an Ebola affected area: No





Patient History





- Patient Medical History


Hx Anemia: No


Hx Asthma: No


Hx Chronic Obstructive Pulmonary Disease (COPD): No


Hx Cancer: No


Hx Cardiac Disorders: No


Hx Congestive Heart Failure: No


Hx Hypertension: No


Hx Hypercholesterolemia: No


Hx Pacemaker: No


HX Cerebrovascular Accident: No


Hx Seizures: Yes (Not on medication)


Hx Dementia: No


Hx Diabetes: No


Hx Gastrointestinal Disorders: No


Hx Liver Disease: No


Hx Genitourinary Disorders: No


Hx Sexually Transmitted Disorders: No


Hx Renal Disease (ESRD): No


Hx Thyroid Disease: No


Hx Human Immunodeficiency Virus (HIV): No (Neggative 2019)


Hx Hepatitis C: No


Hx Depression: Yes (Effexor, Bupriopion)


Hx Suicide Attempt: No (Denies suicide attempt/ [id number] ideation at this time)


Hx Bipolar Disorder: No


Hx Schizophrenia: No





- Patient Surgical History


Past Surgical History: No


Hx Neurologic Surgery: No


Hx Cataract Extraction: No


Hx Cardiac Surgery: No


Hx Lung Surgery: No


Hx Breast Surgery: No


Hx Breast Biopsy: No


Hx Abdominal Surgery: No


Hx Appendectomy: No


Hx Cholecystectomy: No


Hx Genitourinary Surgery: No


Hx  Section: No


Hx Orthopedic Surgery: No


Anesthesia Reaction: No





- Reproductive History


Last Menstrual Period: 19





- Smoking Cessation


Smoking history: Never smoked


Have you smoked in the past 12 months: No


Hx Chewing Tobacco Use: No





- Substances abused


  ** Alcohol


Substance route: Oral


Frequency: Daily


Amount used: 1 pint


Age of first use: 14


Date of last use: 19





Screened but not Admitted





- Documentation of Visit


Level of Care Recommended at this Time: ER Evaluation/Care (S/p fall for fall 

protocol 1, requiring Head CT in [facility], pt will be evaluated for detox after d/

c from [facility])





Breathalyzer





- Breathalyzer


Breathalyzer: 0.105





Urine Drug Screen





- Test Device


Lot number: [id number]


Expiration date: 21





- Control


Is test valid?: Yes





- Results


Drug screen NEGATIVE: No


Urine drug screen results: BZO-Benzodiazepines





Inpatient Rehab Admission





- Rehab Decision to Admit


Inpatient rehab admission?: No

## 2019-12-12 LAB
ALBUMIN SERPL-MCNC: 3.3 G/DL (ref 3.4–5)
ALP SERPL-CCNC: 68 U/L (ref 45–117)
ALT SERPL-CCNC: 23 U/L (ref 13–61)
ANION GAP SERPL CALC-SCNC: 5 MMOL/L (ref 8–16)
AST SERPL-CCNC: 20 U/L (ref 15–37)
BILIRUB SERPL-MCNC: 0.7 MG/DL (ref 0.2–1)
BUN SERPL-MCNC: 15.8 MG/DL (ref 7–18)
CALCIUM SERPL-MCNC: 8.5 MG/DL (ref 8.5–10.1)
CHLORIDE SERPL-SCNC: 105 MMOL/L (ref 98–107)
CO2 SERPL-SCNC: 29 MMOL/L (ref 21–32)
CREAT SERPL-MCNC: 0.8 MG/DL (ref 0.55–1.3)
DEPRECATED RDW RBC AUTO: 18.1 % (ref 11.6–15.6)
GLUCOSE SERPL-MCNC: 89 MG/DL (ref 74–106)
HCT VFR BLD CALC: 33.7 % (ref 32.4–45.2)
HGB BLD-MCNC: 11.1 GM/DL (ref 10.7–15.3)
MCH RBC QN AUTO: 29.4 PG (ref 25.7–33.7)
MCHC RBC AUTO-ENTMCNC: 32.9 G/DL (ref 32–36)
MCV RBC: 89.5 FL (ref 80–96)
PLATELET # BLD AUTO: 302 K/MM3 (ref 134–434)
PMV BLD: 8.6 FL (ref 7.5–11.1)
POTASSIUM SERPLBLD-SCNC: 4.2 MMOL/L (ref 3.5–5.1)
PROT SERPL-MCNC: 6.1 G/DL (ref 6.4–8.2)
RBC # BLD AUTO: 3.77 M/MM3 (ref 3.6–5.2)
SODIUM SERPL-SCNC: 138 MMOL/L (ref 136–145)
WBC # BLD AUTO: 7.2 K/MM3 (ref 4–10)

## 2019-12-12 RX ADMIN — Medication SCH MG: at 22:13

## 2019-12-12 RX ADMIN — Medication SCH TAB: at 10:22

## 2019-12-12 RX ADMIN — VENLAFAXINE HYDROCHLORIDE SCH MG: 75 CAPSULE, EXTENDED RELEASE ORAL at 10:43

## 2019-12-12 NOTE — CONSULT
[facility] Psychiatric Consult





- Data


Date of interview: 12/12/19


Admission source: Self-referred


Identifying data: MS Mckinney is a 39 years old single  female, 

unemployed with no source of income, homeless seeking detox treatment for 

alcohol


Substance Abuse History: Reports history of alcohol use. Refer to [facility] 

counselor's summary for further information


Medical History: Significant for history one seizure episode. Patient claims 

that it was due to dehydration


Psychiatric History: Patient is known to writer from a recent encounter during 

an admission to this facility in July 2019. She reports that her first 

psychiatric contact was over 10 years ago when she saw a private psychatrist in 

[place] who diagnosed her with MDD and started her on Prozac. Reports that 

she has been the psychiatrist regularly since. She is currently seeing a 

psychiatrist at [facility] in [facility] and she is currently precribed Wellbutrin XL 

450 mg/day and Effexor XR 75 mg/day.  When seen by writer on 7/22/19, she was 

continued on her psychotropic medications as prescribed.  Denies previous 

psychiatric hospitalization or suicidal attempt. At present, reports feeling 

anxious


Physical/Sexual Abuse/Trauma History: Denies history of emotional, physical or 

sexual abuse as well as DV relationship


Additional Comment: Reports history of one previous arrest on charges of DWI





Mental Status Exam





- Mental Status Exam


Alert and Oriented to: Time, Place, Person


Cognitive Function: Fair


Patient Appearance: Well Groomed


Mood: Anxious


Affect: Appropriate


Patient Behavior: Cooperative


Speech Pattern: Clear


Voice Loudness: Normal


Thought Process: Intact


Hallucinations: Denies


Suicidal Ideation: Denies


Homicidal Ideation: Denies


Insight/Judgement: Poor


Sleep: Well


Appetite: Good


Muscle strength/Tone: Normal


Gait/Station: Normal





Psychiatric Findings





- Problem List (Axis 1, 2,3)


(1) MDD (major depressive disorder)


Current Visit: No   Status: Chronic   





(2) Alcohol-induced anxiety disorder


Current Visit: Yes   Status: Acute   





(3) Alcohol dependence with uncomplicated withdrawal


Current Visit: Yes   Status: Acute   





(4) History of seizure


Current Visit: No   Status: Resolved   





- Initial Treatment Plan


Initial Treatment Plan: 1) Continue Wellbutrin  mg po daily and Effexor 

XR 75 mg po daily.  2) Continue inpatient detoxification

## 2019-12-12 NOTE — PN
S CIWA





- CIWA Score


Nausea/Vomitin


Muscle Tremors: 4-Moderate,w/Arms Extend


Anxiety: 3


Agitation: 1-Slight > Activity


Paroxysmal Sweats: 2


Orientation: 1-Uncertain about Date (date of week)


Tacttile Disturbances: 0-None


Auditory Disturbances: 0-None


Visual Disturbances: 0-None


Headache: 2-Mild


CIWA-Ar Total Score: 15





BHS Progress Note (SOAP)


Subjective: 





39 years old female admitted on 19 for alcohol withdrawal sx management


fell upon admission REJI 105


ER evaluation


medically cleared returned to detox REJI 119


treating with ativan detox regimen


left eye superior orbital echymosis


denies pain denies vision alteration


ate breakfast ambulating on hallway denies dizziness





Ensure supplement


Objective: 





19 09:24


 Vital Signs











Temperature  98.1 F   19 06:35


 


Pulse Rate  85   19 06:35


 


Respiratory Rate  16   19 06:35


 


Blood Pressure  106/63   19 06:35


 


O2 Sat by Pulse Oximetry (%)      











19 09:24


lab pending


Assessment: 





19 09:24


alcohol withdrawal 


Plan: 





ativan regimen

## 2019-12-13 RX ADMIN — Medication SCH MG: at 22:29

## 2019-12-13 RX ADMIN — VENLAFAXINE HYDROCHLORIDE SCH MG: 75 CAPSULE, EXTENDED RELEASE ORAL at 10:31

## 2019-12-13 RX ADMIN — Medication SCH TAB: at 10:31

## 2019-12-13 NOTE — PN
Teaching Attending Note


Name of Resident: Meggan Machado





ATTENDING PHYSICIAN STATEMENT





I saw and evaluated the patient.


I reviewed the resident's note and discussed the case with the resident.


I agree with the resident's findings and plan as documented.








SUBJECTIVE:


Agree with resident subjective findings.





OBJECTIVE:


Agree with resident objective findings.





ASSESSMENT AND PLAN:


Agree with plan for admission.

## 2019-12-13 NOTE — PN
S CIWA





- CIWA Score


Nausea/Vomitin-No Nausea/No Vomiting


Muscle Tremors: 1-None Visible, but Felt


Anxiety: 1-Mildly Anxious


Agitation: 0-Normal Activity


Paroxysmal Sweats: 2


Orientation: 0-Oriented


Tacttile Disturbances: 1-Very Mild Itch/Numbness


Auditory Disturbances: 0-None


Visual Disturbances: 0-None


Headache: 0-None Present


CIWA-Ar Total Score: 5





BHS Progress Note (SOAP)


Subjective: 





interrupted sleep, sweats 


Objective: 





19 11:32


 Vital Signs











Temperature  98.5 F   19 09:20


 


Pulse Rate  95 H  19 09:20


 


Respiratory Rate  18   19 09:20


 


Blood Pressure  98/66   19 09:20


 


O2 Sat by Pulse Oximetry (%)      








 Laboratory Tests











  19





  08:00 08:00 08:00


 


WBC  7.2  


 


RBC  3.77  


 


Hgb  11.1  


 


Hct  33.7  


 


MCV  89.5  


 


MCH  29.4  


 


MCHC  32.9  


 


RDW  18.1 H  


 


Plt Count  302  


 


MPV  8.6  


 


Sodium   138 


 


Potassium   4.2 


 


Chloride   105 


 


Carbon Dioxide   29 


 


Anion Gap   5 L 


 


BUN   15.8 


 


Creatinine   0.8 


 


Est GFR (CKD-EPI)AfAm   108.39 


 


Est GFR (CKD-EPI)NonAf   93.52 


 


Random Glucose   89 


 


Calcium   8.5 


 


Total Bilirubin   0.7 


 


AST   20 


 


ALT   23 


 


Alkaline Phosphatase   68 


 


Total Protein   6.1 L 


 


Albumin   3.3 L 


 


RPR Titer    Nonreactive








pt aox3 in nad ambulating well


OS periorbital ecchymosis with edema , Perrl, eom intact 


Assessment: 





19 11:34


withdrawal sx's 


periorbital edema 


Plan: 





cont. detox 


increase fluids 


rest

## 2019-12-13 NOTE — PN
BHS Progress Note


Note: 





patient would like to be evaluated for psychiatric medication,for reevaluation 

by psychiatrist

## 2019-12-14 RX ADMIN — VENLAFAXINE HYDROCHLORIDE SCH MG: 75 CAPSULE, EXTENDED RELEASE ORAL at 10:21

## 2019-12-14 RX ADMIN — Medication SCH TAB: at 10:21

## 2019-12-14 RX ADMIN — Medication SCH MG: at 22:23

## 2019-12-14 NOTE — PN
[facility] CIWA





- CIWA Score


Nausea/Vomitin-No Nausea/No Vomiting


Muscle Tremors: None


Anxiety: 2


Agitation: 0-Normal Activity


Paroxysmal Sweats: 2


Orientation: 0-Oriented


Tacttile Disturbances: 0-None


Auditory Disturbances: 0-None


Visual Disturbances: 0-None


Headache: 0-None Present


CIWA-Ar Total Score: 4





BHS Progress Note (SOAP)


Subjective: 





c/o mild withdrawal symptoms.


Objective: 





19 13:00


 Vital Signs











  19





  06:18 09:21


 


Temperature 97.8 F 98.0 F


 


Pulse Rate 76 86


 


Respiratory 18 16





Rate  


 


Blood Pressure 99/61 105/66








 Laboratory Last Values











WBC  7.2 K/mm3 (4.0-10.0)   19  08:00    


 


RBC  3.77 M/mm3 (3.60-5.2)   19  08:00    


 


Hgb  11.1 GM/dL (10.7-15.3)   19  08:00    


 


Hct  33.7 % (32.4-45.2)   19  08:00    


 


MCV  89.5 fl (80-96)   19  08:00    


 


MCH  29.4 pg (25.7-33.7)   19  08:00    


 


MCHC  32.9 g/dl (32.0-36.0)   19  08:00    


 


RDW  18.1 % (11.6-15.6)  H  19  08:00    


 


Plt Count  302 K/MM3 (134-434)   19  08:00    


 


MPV  8.6 fl (7.5-11.1)   19  08:00    


 


Sodium  138 mmol/L (136-145)   19  08:00    


 


Potassium  4.2 mmol/L (3.5-5.1)   19  08:00    


 


Chloride  105 mmol/L ()   19  08:00    


 


Carbon Dioxide  29 mmol/L (21-32)   19  08:00    


 


Anion Gap  5 MMOL/L (8-16)  L  19  08:00    


 


BUN  15.8 mg/dL (7-18)   19  08:00    


 


Creatinine  0.8 mg/dL (0.55-1.3)   19  08:00    


 


Est GFR (CKD-EPI)AfAm  108.39   19  08:00    


 


Est GFR (CKD-EPI)NonAf  93.52   19  08:00    


 


Random Glucose  89 mg/dL ()   19  08:00    


 


Calcium  8.5 mg/dL (8.5-10.1)   19  08:00    


 


Total Bilirubin  0.7 mg/dL (0.2-1)   19  08:00    


 


AST  20 U/L (15-37)   19  08:00    


 


ALT  23 U/L (13-61)   19  08:00    


 


Alkaline Phosphatase  68 U/L ()   19  08:00    


 


Total Protein  6.1 g/dl (6.4-8.2)  L  19  08:00    


 


Albumin  3.3 g/dl (3.4-5.0)  L  19  08:00    


 


RPR Titer  Nonreactive  (NONREACTIVE)   19  08:00    








Labs noted.


Assessment: 





19 13:00


AOX3, in no acute respiratory distress.


Full ROM, ambulating in the unit.


Withdrawal symptoms.


For d/c tomorrow.


Plan: 





continue detox.


D/C in AM.

## 2019-12-15 VITALS — DIASTOLIC BLOOD PRESSURE: 73 MMHG | SYSTOLIC BLOOD PRESSURE: 117 MMHG | TEMPERATURE: 98.7 F | HEART RATE: 100 BPM

## 2019-12-15 RX ADMIN — Medication SCH TAB: at 09:56

## 2019-12-15 RX ADMIN — VENLAFAXINE HYDROCHLORIDE SCH MG: 75 CAPSULE, EXTENDED RELEASE ORAL at 09:56

## 2019-12-15 NOTE — PN
BHS Progress Note


Note: 





Psychiatric nurse practitioner note:


Patient scheduled for discharge today from [facility] detox. A 14 day prescription of 

Wellbutrin 450 Xl + Effexor 75 XR was electronically sent to [facility], 

[place] [address].

## 2019-12-15 NOTE — DS
BHS Detox Discharge Summary


Admission Date: 


19





Discharge Date: 12/15/19





- History


Present History: Alcohol Dependence


Additional Comments: 





39 years old female admitted on 19 for alcohol withdrawal sx management 

treated with ativan detox regimen patient tolerated well alert oriented x 3 


cardiac s1s2 regular rate rhythm


respiratory clear lung bilaterally on auscultation


extremities full range of motion


left orbital echymosis denies denies alteration vision denies dizziness 





- Physical Exam Results


Vital Signs: 


 Vital Signs











Temperature  98.7 F   12/15/19 09:22


 


Pulse Rate  100 H  12/15/19 09:22


 


Respiratory Rate  16   12/15/19 09:22


 


Blood Pressure  117/73   12/15/19 09:22


 


O2 Sat by Pulse Oximetry (%)      











Pertinent Admission Physical Exam Findings: 





alcohol withdrawal sx


 Laboratory Last Values











WBC  7.2 K/mm3 (4.0-10.0)   19  08:00    


 


RBC  3.77 M/mm3 (3.60-5.2)   19  08:00    


 


Hgb  11.1 GM/dL (10.7-15.3)   19  08:00    


 


Hct  33.7 % (32.4-45.2)   19  08:00    


 


MCV  89.5 fl (80-96)   19  08:00    


 


MCH  29.4 pg (25.7-33.7)   19  08:00    


 


MCHC  32.9 g/dl (32.0-36.0)   19  08:00    


 


RDW  18.1 % (11.6-15.6)  H  19  08:00    


 


Plt Count  302 K/MM3 (134-434)   19  08:00    


 


MPV  8.6 fl (7.5-11.1)   19  08:00    


 


Sodium  138 mmol/L (136-145)   19  08:00    


 


Potassium  4.2 mmol/L (3.5-5.1)   19  08:00    


 


Chloride  105 mmol/L ()   19  08:00    


 


Carbon Dioxide  29 mmol/L (21-32)   19  08:00    


 


Anion Gap  5 MMOL/L (8-16)  L  19  08:00    


 


BUN  15.8 mg/dL (7-18)   19  08:00    


 


Creatinine  0.8 mg/dL (0.55-1.3)   19  08:00    


 


Est GFR (CKD-EPI)AfAm  108.39   19  08:00    


 


Est GFR (CKD-EPI)NonAf  93.52   19  08:00    


 


Random Glucose  89 mg/dL ()   19  08:00    


 


Calcium  8.5 mg/dL (8.5-10.1)   19  08:00    


 


Total Bilirubin  0.7 mg/dL (0.2-1)   19  08:00    


 


AST  20 U/L (15-37)   19  08:00    


 


ALT  23 U/L (13-61)   19  08:00    


 


Alkaline Phosphatase  68 U/L ()   19  08:00    


 


Total Protein  6.1 g/dl (6.4-8.2)  L  19  08:00    


 


Albumin  3.3 g/dl (3.4-5.0)  L  19  08:00    


 


RPR Titer  Nonreactive  (NONREACTIVE)   19  08:00    








lab noted





- Treatment


Hospital Course: Detox Protocol Followed, Detoxed Safely, Responded well, 

Discharged Condition Good, Rehab Referral Accepted


Patient has Accepted a Rehab Referral to: [facility] chemical rehab





- Medication


Discharge Medications: 


Ambulatory Orders





Bupropion HCl [Bupropion Xl] 450 mg PO DAILY 19 


Venlafaxine HCl ER [Effexor Xr -] 75 mg PO DAILY 19 


Bupropion HCl [Wellbutrin Xl -] 450 mg PO DAILY #42 tab.sr.24h 12/15/19 


Venlafaxine HCl ER [Effexor Xr -] 75 mg PO DAILY #14 cap.er.24h 12/15/19 











- Diagnosis


(1) Alcohol dependence with uncomplicated withdrawal


Status: Acute   





(2) Substance induced mood disorder


Status: Suspected   





- AMA


Did Patient Leave Against Medical Advice: No





CIWA Score





- CIWA Score


Nausea/Vomitin-No Nausea/No Vomiting


Muscle Tremors: None


Anxiety: 1-Mildly Anxious


Agitation: 0-Normal Activity


Paroxysmal Sweats: 1-Minimal Palms Moist


Orientation: 0-Oriented


Tacttile Disturbances: 0-None


Auditory Disturbances: 0-None


Visual Disturbances: 0-None


Headache: 0-None Present


CIWA-Ar Total Score: 2

## 2020-07-28 ENCOUNTER — HOSPITAL ENCOUNTER (INPATIENT)
Dept: HOSPITAL 74 - YASAS | Age: 40
LOS: 3 days | Discharge: HOME | End: 2020-07-31
Attending: ALLERGY & IMMUNOLOGY | Admitting: ALLERGY & IMMUNOLOGY
Payer: COMMERCIAL

## 2020-07-28 VITALS — BODY MASS INDEX: 22.6 KG/M2

## 2020-07-28 DIAGNOSIS — F33.9: ICD-10-CM

## 2020-07-28 DIAGNOSIS — Z88.1: ICD-10-CM

## 2020-07-28 DIAGNOSIS — F19.24: ICD-10-CM

## 2020-07-28 DIAGNOSIS — F10.230: Primary | ICD-10-CM

## 2020-07-28 DIAGNOSIS — G47.00: ICD-10-CM

## 2020-07-28 DIAGNOSIS — F17.210: ICD-10-CM

## 2020-07-28 DIAGNOSIS — Z86.69: ICD-10-CM

## 2020-07-28 PROCEDURE — HZ2ZZZZ DETOXIFICATION SERVICES FOR SUBSTANCE ABUSE TREATMENT: ICD-10-PCS | Performed by: ALLERGY & IMMUNOLOGY

## 2020-07-28 PROCEDURE — U0003 INFECTIOUS AGENT DETECTION BY NUCLEIC ACID (DNA OR RNA); SEVERE ACUTE RESPIRATORY SYNDROME CORONAVIRUS 2 (SARS-COV-2) (CORONAVIRUS DISEASE [COVID-19]), AMPLIFIED PROBE TECHNIQUE, MAKING USE OF HIGH THROUGHPUT TECHNOLOGIES AS DESCRIBED BY CMS-2020-01-R: HCPCS

## 2020-07-28 RX ADMIN — Medication SCH MG: at 22:37

## 2020-07-28 RX ADMIN — Medication SCH MG: at 22:39

## 2020-07-28 NOTE — HP
CIWA Score


Nausea/Vomitin


Muscle Tremors: 6


Anxiety: 4-Mod. Anxious/Guarded


Agitation: 4-Moderately Restless


Paroxysmal Sweats: 1-Minimal Palms Moist


Orientation: 0-Oriented


Tacttile Disturbances: 0-None


Auditory Disturbances: 0-None


Visual Disturbances: 0-None


Headache: 3-Moderate


CIWA-Ar Total Score: 20





- Admission Criteria


[facility] Guidelines: Admission for Medically Managed Detox: 


Requires at least one of the followin. CIWA greater than 12


2. Seizures within the past 24 hours


3. Delirium tremens within the past 24 hours


4. Hallucinations within the past 24 hours


5. Acute intervention needed for co  occurring medical disorder


6. Acute intervention needed for co  occurring psychiatric disorder


7. Severe withdrawal that cannot be handled at a lower level of care (continued


    vomiting, continued diarrhea, abnormal vital signs) requiring intravenous


    medication and/or fluids


8. Pregnancy








Admitting History and Physical





- Admission


Chief Complaint: Patient is a 39 kayl[place] old female with history of alcohol use 

disorder, anxiety, depression presents for detox.


History of Present Illness: 


Patient is a 39 year old female with history of alcohol use disorder, anxiety, 

depression presents for detox. 





PMH: alcohol use disorder, (one withdrawal seizure 2019) 


PSH: denies


Psych: anxiety, depression (Lamictal, Effexor, Wellbutrin)


Social: shelter in [place]


Legal: denies





Last visit was  and completed 5 days detox from alcohol.  





- Substance Use History


  ** Alcohol


Frequency of use: Daily


Substance route: Oral


Date of Last Use: 20 (Vodka- drinks 2 pints per day) last drink  

approx 10:30AM. First drink at 23 years old. 





- Last Treatment


Date of last treatment: 2020


Where was last treatment: Detox


History Source: Patient


Limitations to Obtaining History: No Limitations





- Past Medical History


...LMP: 20





- Smoking History


Smoking history: Never smoked


Have you smoked in the past 12 months: No


Aproximately how many cigarettes per day: 1





- Alcohol/Substance Use


Hx Alcohol Use: Yes





Admission ROS [facility]


Allergies/Adverse Reactions: 


                                    Allergies











Allergy/AdvReac Type Severity Reaction Status Date / Time


 


amoxicillin Allergy Severe  Verified 20 13:31











Exam Limitations: No Limitations





- Ebola screening


Have you traveled outside of the country in the last 21 days: No


Have you been sick,other than usual withdrawal symptoms: No


Do you have a fever: No





- Review of Systems


Constitutional: No Symptoms Reported


EENT: denies: Blurred Vision, Recent change in vision


Respiratory: denies: Cough, Shortness of Breath


Cardiac: denies: Chest Pain, Lightheadedness, Palpitations


GI: denies: Nausea, Vomiting, Abdominal cramping


: denies: Burning, Dysuria, Discharge


Musculoskeletal: denies: Joint Pain, Muscle Pain


Integumentary: denies: Rash


Neuro: reports: Headache.  denies: Numbness, Paresthesia


Psychiatric: reports: Anxious





Patient History





- Patient Medical History


Hx Anemia: No


Hx Asthma: No


Hx Chronic Obstructive Pulmonary Disease (COPD): No


Hx Cancer: No


Hx Cardiac Disorders: No


Hx Congestive Heart Failure: No


Hx Hypertension: No


Hx Hypercholesterolemia: No


Hx Pacemaker: No


HX Cerebrovascular Accident: No


Hx Seizures: Yes (Last )


Hx Dementia: No


Hx Diabetes: No


Hx Gastrointestinal Disorders: No


Hx Liver Disease: No


Hx Genitourinary Disorders: No


Hx Sexually Transmitted Disorders: No


Hx Renal Disease (ESRD): No


Hx Thyroid Disease: No


Hx Human Immunodeficiency Virus (HIV): No


Hx Hepatitis C: No


Hx Depression: Yes


Hx Suicide Attempt: No


Hx Bipolar Disorder: No


Hx Schizophrenia: No





- Patient Surgical History


Past Surgical History: No


Hx Neurologic Surgery: No


Hx Cataract Extraction: No


Hx Cardiac Surgery: No


Hx Lung Surgery: No


Hx Breast Surgery: No


Hx Breast Biopsy: No


Hx Abdominal Surgery: No


Hx Appendectomy: No


Hx Cholecystectomy: No


Hx Genitourinary Surgery: No


Hx  Section: No


Hx Orthopedic Surgery: No


Anesthesia Reaction: No





- PPD History


Date: 19


Results: tb gold neg





- Reproductive History


Last Menstrual Period: 20





- Smoking Cessation


Smoking history: Never smoked


Have you smoked in the past 12 months: No


Aproximately how many cigarettes per day: 1


Hx Chewing Tobacco Use: No


Initiated information on smoking cessation: Yes


'Breaking Loose' booklet given: 20





Admission Physical Exam [facility]





- Physical


General Appearance: Yes: Mild Distress, Anxious


HEENTM: Yes: EOMI, Normocephalic, CARMEN


Respiratory: Yes: Lungs Clear, Normal Breath Sounds, No Respiratory Distress, No

Accessory Muscle Use


Neck: Yes: Supple


Cardiology: Yes: Regular Rhythm, Regular Rate, S1, S2


Abdominal: Yes: Normal Bowel Sounds, Non Tender, Flat, Soft


Musculoskeletal: Yes: Within Normal Limits, full range of Motion


Extremities: Yes: Within Normal Limits, Normal Range of Motion


Neurological: Yes: CNs II-XII NML intact, Fully Oriented, Motor Strength 5/5





Cleared for Admission [facility]





- Detox or Rehab


[facility] Level of Care: Medically Managed


Detox Regimen/Protocol: Librium





Breathalyzer





- Breathalyzer


Breathalyzer: 0.169





Urine Drug Screen





- Test Device


Lot number: [id number]


Expiration date: 22





- Control


Is test valid?: Yes





- Results


Drug screen NEGATIVE: No


Urine drug screen results: BZO-Benzodiazepines





Inpatient Rehab Admission





- Rehab Decision to Admit


Inpatient rehab admission?: No

## 2020-07-28 NOTE — BHS.RME
Substance Use & Tx History





- Substance Use History


  ** Alcohol


Frequency of use: Daily


Substance route: Oral


Date of Last Use: 20





- Last Treatment


Date of last treatment: 2020


Where was last treatment: Detox





Physical/Psych/Mental Status





- Behavior


General Behavior: Increased activity (restlessness, agitation)


Eye Contact: Normal


Other Behaviors: Mannerisms





- Cooperativeness


Cooperativeness: Cooperative





- Thinking


Thought Processes: Goal Directed


Thought content: Future oriented





- Physical Health Problems


Is patient presently having any pain?: No


Does patient presently have any injuries (include location): No


Does patient currently have a fever: No


Is patient pregnant: No





CIWA


Nausea/Vomitin


Muscle Tremors: 6


Anxiety: 4-Mod. Anxious/Guarded


Agitation: 4-Moderately Restless


Paroxysmal Sweats: 1-Minimal Palms Moist


Orientation: 0-Oriented


Tacttile Disturbances: 0-None


Auditory Disturbances: 0-None


Visual Disturbances: 0-None


Headache: 3-Moderate


CIWA-Ar Total Score: 20





Treatment Recommendation





- Level of Care


Level of Care: Acute Medical (alcohol detoxification)

## 2020-07-28 NOTE — PN
Teaching Attending Note


Name of Resident: Roly Ken





ATTENDING PHYSICIAN STATEMENT





I saw and evaluated the patient.


I reviewed the resident's note and discussed the case with the resident.


I agree with the resident's findings and plan as documented.








SUBJECTIVE:39  y.o.  female requesting detox from ETOH  use, reports  2  

pints/day  , went  to [facility]  , given  Librium in the ER  . h/o 

withdrawal seizures and ICU  admission, most  recently  May 2019 . 








OBJECTIVE: wnwd  ,  tremulous  , intoxicated . 


                               Vital Signs - 24 hr











  07/28/20





  18:32


 


Temperature 98.1 F


 


Pulse Rate 100 H


 


Respiratory 18





Rate 


 


Blood Pressure 115/72

















ASSESSMENT AND PLAN: AUD  - Librium detox  .

## 2020-07-29 LAB
ALBUMIN SERPL-MCNC: 3.1 G/DL (ref 3.4–5)
ALP SERPL-CCNC: 56 U/L (ref 45–117)
ALT SERPL-CCNC: 29 U/L (ref 13–61)
ANION GAP SERPL CALC-SCNC: 7 MMOL/L (ref 8–16)
AST SERPL-CCNC: 38 U/L (ref 15–37)
BILIRUB SERPL-MCNC: 0.5 MG/DL (ref 0.2–1)
BUN SERPL-MCNC: 9.4 MG/DL (ref 7–18)
CALCIUM SERPL-MCNC: 8.2 MG/DL (ref 8.5–10.1)
CHLORIDE SERPL-SCNC: 108 MMOL/L (ref 98–107)
CO2 SERPL-SCNC: 26 MMOL/L (ref 21–32)
CREAT SERPL-MCNC: 0.8 MG/DL (ref 0.55–1.3)
DEPRECATED RDW RBC AUTO: 16.5 % (ref 11.6–15.6)
GLUCOSE SERPL-MCNC: 82 MG/DL (ref 74–106)
HCT VFR BLD CALC: 30.7 % (ref 32.4–45.2)
HGB BLD-MCNC: 10.1 GM/DL (ref 10.7–15.3)
MCH RBC QN AUTO: 30.5 PG (ref 25.7–33.7)
MCHC RBC AUTO-ENTMCNC: 32.9 G/DL (ref 32–36)
MCV RBC: 92.8 FL (ref 80–96)
PLATELET # BLD AUTO: 196 K/MM3 (ref 134–434)
PMV BLD: 10 FL (ref 7.5–11.1)
POTASSIUM SERPLBLD-SCNC: 3.9 MMOL/L (ref 3.5–5.1)
PROT SERPL-MCNC: 5.6 G/DL (ref 6.4–8.2)
RBC # BLD AUTO: 3.31 M/MM3 (ref 3.6–5.2)
SODIUM SERPL-SCNC: 141 MMOL/L (ref 136–145)
WBC # BLD AUTO: 5.3 K/MM3 (ref 4–10)

## 2020-07-29 RX ADMIN — Medication SCH MG: at 22:02

## 2020-07-29 RX ADMIN — NICOTINE SCH: 7 PATCH TRANSDERMAL at 10:06

## 2020-07-29 RX ADMIN — Medication SCH TAB: at 10:06

## 2020-07-29 NOTE — CONSULT
[facility] Psychiatric Consult





- Data


Date of interview: 07/29/20


Admission source: [facility]


Identifying data: Revisit to [facility] and admission to [address] [place] [address] for this 40 y/o 

 female self-referred for detoxification treatment. SALMA issue : alcohol

dependence. Patient is single, no dependents, homeless, unemployed and supported

on welfare.


Substance Abuse History: Discussed with the patient. SALMA profile as follows : 

Alcohol.  Frequency of use: Daily.  Substance route: Oral.  Date of Last Use: 

07/28/20 (Vodka- drinks 2 pints per day) last drink 07/28 approx 10:30AM. First 

drink at 23 years old.  Last Treatment.  Date of last treatment: 06/12/2020.  

Where was last treatment: Detox.  History Source: Patient.  Limitations to 

Obtaining History: No Limitations.  Smoking History.  Smoking history: Never 

smoked.  History of multiple relapses.


Medical History: Medical history is remarkable for antecedent of withdrawal-

related seizures (one episode). Patient endorses good general health.


Psychiatric History: Patient denies history of psychiatric hospitalizations. 

Diagnosed with MDD approximately 10 years ago (treated with prozac). As per 

records ([facility]), the patient is known to Dr Huggins ([facility]

in the [place]) who prescribed her wellbutrin  mg/day + effexor XR 75 mg/day

+ lamictal 150 mg/day. Ms villarreal states that she is currently seeing Dr Daniel at the [facility] clinic in [place], [place]. Patient denies 

history of suicide attempts.


Physical/Sexual Abuse/Trauma History: Patient denies history of abuse.


Additional Comment: Urine drug screen results: BZO-Benzodiazepines





Mental Status Exam





- Mental Status Exam


Alert and Oriented to: Time, Place, Person


Cognitive Function: Good


Patient Appearance: Well Groomed


Mood: Withdrawn


Affect: Appropriate, Normal Range


Patient Behavior: Fatigued, Appropriate, Cooperative


Speech Pattern: Clear, Appropriate


Voice Loudness: Normal


Thought Process: Intact, Goal Oriented


Thought Disorder: Not Present


Hallucinations: Denies


Suicidal Ideation: Denies


Homicidal Ideation: Denies


Insight/Judgement: Poor


Sleep: Poorly, Difficulty falling asleep


Appetite: Good


Gait/Station: Normal





Psychiatric Findings





- Problem List (Axis 1, 2,3)


(1) Alcohol dependence with uncomplicated withdrawal


Current Visit: Yes   Status: Acute   





(2) Nicotine dependence


Current Visit: Yes   Status: Chronic   


Qualifiers: 


   Nicotine product type: cigarettes 





(3) Substance induced mood disorder


Current Visit: Yes   Status: Chronic   





(4) MDD (major depressive disorder)


Current Visit: Yes   Status: Chronic   





(5) Insomnia


Current Visit: Yes   Status: Chronic   





- Initial Treatment Plan


Initial Treatment Plan: Psychoeducation. Sleep hygiene. Detoxification. 

Motivational counseling. Resumed, at patient's request : wellbutrin  mg po

daily + effexor XL 75 mg po daily + lamotrigine 150 mg po daily. Side 

effects/benefits of these medications are discussed with the patient. She grants

consent (verbal) to MD. [place].

## 2020-07-29 NOTE — PN
S CIWA





- CIWA Score


Nausea/Vomitin-Mild Nausea/No Vomiting


Muscle Tremors: 4-Moderate,w/Arms Extend


Anxiety: 3


Agitation: 2


Paroxysmal Sweats: 1-Minimal Palms Moist


Orientation: 0-Oriented


Tacttile Disturbances: 1-Very Mild Itch/Numbness


Auditory Disturbances: 0-None


Visual Disturbances: 2-Mild Sensitivity


Headache: 1-Very Mild


CIWA-Ar Total Score: 15





BHS Progress Note (SOAP)


Subjective: 





39 years old female admitted on 20 for alcohol withdrawal sx management 

treating with librium detox regiment 


feeling tired ate breakfast and lunch in room showered 


bmi 22.7 ensure 120 ml po tid with meals 


Objective: 





20 12:32


                               Vital Signs - 24 hr











  20





  18:32 19:04 19:28


 


Temperature 98.1 F  97.7 F


 


Pulse Rate 100 H  101 H


 


Respiratory 18  18





Rate   


 


Blood Pressure 115/72  120/84


 


O2 Sat by Pulse  97 100





Oximetry (%)   














  20





  20:43 05:56 08:43


 


Temperature 97.8 F 97.4 F L 97.1 F L


 


Pulse Rate 94 H 67 64


 


Respiratory 18 18 18





Rate   


 


Blood Pressure 116/74 99/57 L 103/58 L


 


O2 Sat by Pulse 98 99 





Oximetry (%)   








                                Laboratory Tests











  20





  17:35 08:00 08:00


 


WBC    5.3


 


RBC    3.31 L


 


Hgb    10.1 L


 


Hct    30.7 L


 


MCV    92.8


 


MCH    30.5


 


MCHC    32.9


 


RDW    16.5 H


 


Plt Count    196  D


 


MPV    10.0  D


 


Sodium   


 


Potassium   


 


Chloride   


 


Carbon Dioxide   


 


Anion Gap   


 


BUN   


 


Creatinine   


 


Est GFR (CKD-EPI)AfAm   


 


Est GFR (CKD-EPI)NonAf   


 


Random Glucose   


 


Calcium   


 


Total Bilirubin   


 


AST   


 


ALT   


 


Alkaline Phosphatase   


 


Total Protein   


 


Albumin   


 


POC Urine HCG, Qual  Negative  


 


Syphilis Serology   Non-reactive 














  20





  08:00


 


WBC 


 


RBC 


 


Hgb 


 


Hct 


 


MCV 


 


MCH 


 


MCHC 


 


RDW 


 


Plt Count 


 


MPV 


 


Sodium  141


 


Potassium  3.9


 


Chloride  108 H


 


Carbon Dioxide  26


 


Anion Gap  7 L


 


BUN  9.4


 


Creatinine  0.8


 


Est GFR (CKD-EPI)AfAm  108.39


 


Est GFR (CKD-EPI)NonAf  93.52


 


Random Glucose  82


 


Calcium  8.2 L


 


Total Bilirubin  0.5


 


AST  38 H


 


ALT  29


 


Alkaline Phosphatase  56


 


Total Protein  5.6 L


 


Albumin  3.1 L


 


POC Urine HCG, Qual 


 


Syphilis Serology 








lab noted


Assessment: 





20 12:32


alcohol withdrawal 


Plan: 





librium regiment

## 2020-07-30 RX ADMIN — HYDROXYZINE PAMOATE PRN MG: 25 CAPSULE ORAL at 13:45

## 2020-07-30 RX ADMIN — Medication SCH MG: at 22:08

## 2020-07-30 RX ADMIN — HYDROXYZINE PAMOATE PRN MG: 25 CAPSULE ORAL at 22:10

## 2020-07-30 RX ADMIN — Medication SCH TAB: at 10:11

## 2020-07-30 RX ADMIN — NICOTINE SCH: 7 PATCH TRANSDERMAL at 10:12

## 2020-07-30 RX ADMIN — Medication SCH MG: at 22:09

## 2020-07-30 RX ADMIN — VENLAFAXINE HYDROCHLORIDE SCH MG: 75 CAPSULE, EXTENDED RELEASE ORAL at 10:11

## 2020-07-30 RX ADMIN — HYDROXYZINE PAMOATE PRN MG: 25 CAPSULE ORAL at 17:19

## 2020-07-30 NOTE — PN
S CIWA





- CIWA Score


Nausea/Vomitin-No Nausea/No Vomiting


Muscle Tremors: 2


Anxiety: 2


Agitation: 1-Slight > Activity


Paroxysmal Sweats: 1-Minimal Palms Moist


Orientation: 0-Oriented


Tacttile Disturbances: 0-None


Auditory Disturbances: 0-None


Visual Disturbances: 2-Mild Sensitivity


Headache: 2-Mild


CIWA-Ar Total Score: 10





[facility] Progress Note (SOAP)


Subjective: 





39 years old female admitted on 20 for alcohol withdrawal sx management 

treating with librium detox regiment


seen by psychiatrist resume wellbutrin effexor and lomotrigen


feeling better today good eye contact ate 60% breakfast 





Objective: 





20 10:25


                               Vital Signs - 24 hr











  20





  12:46 16:28 20:32


 


Temperature 97.5 F L 97.3 F L 97.5 F L


 


Pulse Rate 68 59 L 72


 


Respiratory 18 18 18





Rate   


 


Blood Pressure 101/53 L 100/70 108/71


 


O2 Sat by Pulse 97  99





Oximetry (%)   














  20





  06:39 09:24


 


Temperature 97.2 F L 98.2 F


 


Pulse Rate 73 70


 


Respiratory 16 18





Rate  


 


Blood Pressure 100/51 L 96/60


 


O2 Sat by Pulse 99 99





Oximetry (%)  








                                Laboratory Tests











  20





  17:35 18:40 08:00


 


WBC   


 


RBC   


 


Hgb   


 


Hct   


 


MCV   


 


MCH   


 


MCHC   


 


RDW   


 


Plt Count   


 


MPV   


 


Sodium   


 


Potassium   


 


Chloride   


 


Carbon Dioxide   


 


Anion Gap   


 


BUN   


 


Creatinine   


 


Est GFR (CKD-EPI)AfAm   


 


Est GFR (CKD-EPI)NonAf   


 


Random Glucose   


 


Calcium   


 


Total Bilirubin   


 


AST   


 


ALT   


 


Alkaline Phosphatase   


 


Total Protein   


 


Albumin   


 


POC Urine HCG, Qual  Negative  


 


Syphilis Serology    Non-reactive


 


COVID-19 ([facility])   Not detected 














  20





  08:00 08:00


 


WBC  5.3 


 


RBC  3.31 L 


 


Hgb  10.1 L 


 


Hct  30.7 L 


 


MCV  92.8 


 


MCH  30.5 


 


MCHC  32.9 


 


RDW  16.5 H 


 


Plt Count  196  D 


 


MPV  10.0  D 


 


Sodium   141


 


Potassium   3.9


 


Chloride   108 H


 


Carbon Dioxide   26


 


Anion Gap   7 L


 


BUN   9.4


 


Creatinine   0.8


 


Est GFR (CKD-EPI)AfAm   108.39


 


Est GFR (CKD-EPI)NonAf   93.52


 


Random Glucose   82


 


Calcium   8.2 L


 


Total Bilirubin   0.5


 


AST   38 H


 


ALT   29


 


Alkaline Phosphatase   56


 


Total Protein   5.6 L


 


Albumin   3.1 L


 


POC Urine HCG, Qual  


 


Syphilis Serology  


 


COVID-19 ([facility])  








lab noted


Assessment: 





20 10:26


alcohol withdrawal 


Plan: 





librium regiment

## 2020-07-31 VITALS — DIASTOLIC BLOOD PRESSURE: 77 MMHG | SYSTOLIC BLOOD PRESSURE: 118 MMHG | TEMPERATURE: 97.4 F | HEART RATE: 97 BPM

## 2020-07-31 RX ADMIN — Medication SCH TAB: at 09:37

## 2020-07-31 RX ADMIN — VENLAFAXINE HYDROCHLORIDE SCH MG: 75 CAPSULE, EXTENDED RELEASE ORAL at 09:35

## 2020-07-31 RX ADMIN — NICOTINE SCH: 7 PATCH TRANSDERMAL at 09:37

## 2020-07-31 NOTE — DS
[facility] Detox Discharge Summary


Admission Date: 


07/28/20





Discharge Date: 07/31/20





- History


Present History: Alcohol Dependence


Pertinent Past History: 





Ms. Mckinney is a 40 yo woman who was admitted on 7/28 for detox with a hx of 

alcohol use disorder.


She will be discharged today with plans to go to [facility] for inpatient 

rehab.


She plans to resume Vivitrol injections.


Transportation has been arranged with [employer].  Pt voices understanding of 

the importance of following up with rehab plans.  


                                Laboratory Tests











  07/28/20 07/28/20 07/29/20





  17:35 18:40 08:00


 


WBC   


 


RBC   


 


Hgb   


 


Hct   


 


MCV   


 


MCH   


 


MCHC   


 


RDW   


 


Plt Count   


 


MPV   


 


Sodium   


 


Potassium   


 


Chloride   


 


Carbon Dioxide   


 


Anion Gap   


 


BUN   


 


Creatinine   


 


Est GFR (CKD-EPI)AfAm   


 


Est GFR (CKD-EPI)NonAf   


 


Random Glucose   


 


Calcium   


 


Total Bilirubin   


 


AST   


 


ALT   


 


Alkaline Phosphatase   


 


Total Protein   


 


Albumin   


 


POC Urine HCG, Qual  Negative  


 


Syphilis Serology    Non-reactive


 


COVID-19 ([facility])   Not detected 














  07/29/20 07/29/20





  08:00 08:00


 


WBC  5.3 


 


RBC  3.31 L 


 


Hgb  10.1 L 


 


Hct  30.7 L 


 


MCV  92.8 


 


MCH  30.5 


 


MCHC  32.9 


 


RDW  16.5 H 


 


Plt Count  196  D 


 


MPV  10.0  D 


 


Sodium   141


 


Potassium   3.9


 


Chloride   108 H


 


Carbon Dioxide   26


 


Anion Gap   7 L


 


BUN   9.4


 


Creatinine   0.8


 


Est GFR (CKD-EPI)AfAm   108.39


 


Est GFR (CKD-EPI)NonAf   93.52


 


Random Glucose   82


 


Calcium   8.2 L


 


Total Bilirubin   0.5


 


AST   38 H


 


ALT   29


 


Alkaline Phosphatase   56


 


Total Protein   5.6 L


 


Albumin   3.1 L


 


POC Urine HCG, Qual  


 


Syphilis Serology  


 


COVID-19 ([facility])  








                              Home Medication List











 Medication  Instructions  Recorded  Confirmed  Type


 


Lamotrigine [Lamictal] 150 mg PO DAILY 06/02/20 07/28/20 History


 


Bupropion HCl [Wellbutrin Xl -] 300 mg PO DAILY 06/16/20 07/28/20 History








                               Active Medications











Generic Name Dose Route Start Last Admin





  Trade Name Freq  PRN Reason Stop Dose Admin


 


Acetaminophen  650 mg  07/28/20 17:51 





  Tylenol -  PO  





  Q6H PRN  





  PAIN LEVEL 4 - 6  


 


Acetaminophen  650 mg  07/28/20 17:51 





  Tylenol -  PO  





  Q6H PRN  





  FEVER  


 


Al Hydroxide/Mg Hydroxide  30 ml  07/28/20 17:51 





  Mylanta Oral Suspension -  PO  





  Q6H PRN  





  DYSPEPSIA  


 


Bismuth Subsalicylate  524 mg  07/28/20 17:51 





  Pepto-Bismol -  PO  





  Q1H PRN  





  DIARRHEA  


 


Bupropion HCl  300 mg  07/30/20 10:00  07/31/20 09:35





  Wellbutrin Xl -  PO   300 mg





  DAILY STACI   Administration


 


Chlordiazepoxide HCl  10 mg  07/31/20 05:00  07/31/20 10:09





  Librium -  PO  07/31/20 23:01  10 mg





  [id number] STACI   Administration


 


Chlordiazepoxide HCl  10 mg  08/01/20 05:00 





  Librium -  PO  08/01/20 17:01 





  Q12H STACI  


 


Chlordiazepoxide HCl  10 mg  07/31/20 00:00 





  Librium -  PO  08/01/20 00:00 





  Q4H PRN  





  WITHDRAWAL(CONT SUBST)  


 


Chlordiazepoxide HCl  10 mg  08/02/20 05:00 





  Librium -  PO  08/02/20 05:01 





  ONCE@0500 ONE  


 


Eucalyptus/Menthol/Phenol/Sorbitol  1 each  07/28/20 17:51 





  Cepastat Lozenge -  MM  08/03/20 17:51 





  Q4H PRN  





  SORE THROAT  


 


Hydroxyzine Pamoate  25 mg  07/28/20 18:44  07/30/20 22:10





  Vistaril -  PO  08/03/20 17:51  25 mg





  Q4H PRN   Administration





  ANXIETY  


 


Ibuprofen  400 mg  07/28/20 17:51 





  Motrin -  PO  





  Q6H PRN  





  PAIN LEVEL 1 - 3  


 


Magnesium Citrate  300 ml  07/28/20 17:51 





  Citroma -  PO  





  Q48H PRN  





  CONSTIPATION  


 


Magnesium Hydroxide  30 ml  07/28/20 17:51 





  Milk Of Magnesia -  PO  





  PRN PRN  





  CONSTIPATION  


 


Melatonin  5 mg  07/28/20 22:00  07/30/20 22:09





  Melatonin  PO   5 mg





  HS STACI   Administration


 


Methocarbamol  500 mg  07/28/20 17:51 





  Robaxin -  PO  08/03/20 17:51 





  Q6H PRN  





  MUSCLE SPASMS  


 


Nicotine  7 mg  07/29/20 10:00  07/31/20 09:37





  Nicoderm Patch -  TD   Not Given





  DAILY STACI  


 


Nicotine Polacrilex  2 mg  07/28/20 17:51 





  Nicorette Gum -  BUC  





  Q2H PRN  





  NICOTINE REPLACEMENT RX  


 


Ondansetron HCl  4 mg  07/28/20 17:51 





  Zofran Odt -  SL  08/03/20 17:52 





  Q8H PRN  





  Nausea/Vomiting  


 


Prenatal Multivit/Folic Acid/Iron  1 tab  07/29/20 10:00  07/31/20 09:37





  Prenatal Vitamins (Sjr) -  PO   1 tab





  DAILY STACI   Administration


 


Thiamine HCl  100 mg  07/28/20 22:00  07/30/20 22:08





  Vitamin B1 -  PO   100 mg





  HS STACI   Administration


 


Venlafaxine HCl  75 mg  07/30/20 10:00  07/31/20 09:35





  Effexor Xr -  PO   75 mg





  DAILY STACI   Administration








                               Vital Signs - 24 hr











  07/30/20 07/30/20 07/30/20





  12:30 15:39 20:33


 


Temperature 97.5 F L 97.1 F L 97.5 F L


 


Pulse Rate 118 H 70 68


 


Respiratory 18 16 18





Rate   


 


Blood Pressure 130/93 105/70 117/79


 


O2 Sat by Pulse 99 99 100





Oximetry (%)   














  07/31/20 07/31/20





  06:35 08:40


 


Temperature 97.3 F L 97.4 F L


 


Pulse Rate 68 97 H


 


Respiratory 18 18





Rate  


 


Blood Pressure 99/51 L 118/77


 


O2 Sat by Pulse 100 100





Oximetry (%)  














- Physical Exam Results


Vital Signs: 


                                   Vital Signs











Temperature  97.4 F L  07/31/20 08:40


 


Pulse Rate  97 H  07/31/20 08:40


 


Respiratory Rate  18   07/31/20 08:40


 


Blood Pressure  118/77   07/31/20 08:40


 


O2 Sat by Pulse Oximetry (%)  100   07/31/20 08:40














- Treatment


Hospital Course: Detox Protocol Followed, Detoxed Safely, Responded well, 

Discharged Condition Good, Rehab Referral Accepted


Patient has Accepted a Rehab Referral to: [facility] inpatient Rehab





- Medication


Discharge Medications: 


Ambulatory Orders





Venlafaxine HCl ER [Effexor Xr -] 75 mg PO DAILY #14 cap.er.24h 12/15/19 


Lamotrigine [Lamictal] 150 mg PO DAILY 06/02/20 


Bupropion HCl [Wellbutrin Xl -] 300 mg PO DAILY 06/16/20 











- AMA


Did Patient Leave Against Medical Advice: No

## 2021-12-26 ENCOUNTER — HOSPITAL ENCOUNTER (INPATIENT)
Dept: HOSPITAL 74 - YASAS | Age: 41
LOS: 4 days | Discharge: HOME | End: 2021-12-30
Attending: ALLERGY & IMMUNOLOGY | Admitting: ALLERGY & IMMUNOLOGY
Payer: COMMERCIAL

## 2021-12-26 VITALS — BODY MASS INDEX: 27.4 KG/M2

## 2021-12-26 DIAGNOSIS — R73.9: ICD-10-CM

## 2021-12-26 DIAGNOSIS — F17.210: ICD-10-CM

## 2021-12-26 DIAGNOSIS — Z86.69: ICD-10-CM

## 2021-12-26 DIAGNOSIS — F10.24: ICD-10-CM

## 2021-12-26 DIAGNOSIS — F10.280: ICD-10-CM

## 2021-12-26 DIAGNOSIS — Z88.1: ICD-10-CM

## 2021-12-26 DIAGNOSIS — R25.3: ICD-10-CM

## 2021-12-26 DIAGNOSIS — F10.230: Primary | ICD-10-CM

## 2021-12-26 DIAGNOSIS — F19.24: ICD-10-CM

## 2021-12-26 DIAGNOSIS — F32.A: ICD-10-CM

## 2021-12-26 PROCEDURE — C9803 HOPD COVID-19 SPEC COLLECT: HCPCS

## 2021-12-26 PROCEDURE — HZ2ZZZZ DETOXIFICATION SERVICES FOR SUBSTANCE ABUSE TREATMENT: ICD-10-PCS | Performed by: ALLERGY & IMMUNOLOGY

## 2021-12-26 PROCEDURE — U0005 INFEC AGEN DETEC AMPLI PROBE: HCPCS

## 2021-12-26 PROCEDURE — U0003 INFECTIOUS AGENT DETECTION BY NUCLEIC ACID (DNA OR RNA); SEVERE ACUTE RESPIRATORY SYNDROME CORONAVIRUS 2 (SARS-COV-2) (CORONAVIRUS DISEASE [COVID-19]), AMPLIFIED PROBE TECHNIQUE, MAKING USE OF HIGH THROUGHPUT TECHNOLOGIES AS DESCRIBED BY CMS-2020-01-R: HCPCS

## 2021-12-26 RX ADMIN — HYDROXYZINE PAMOATE PRN MG: 25 CAPSULE ORAL at 20:13

## 2021-12-26 RX ADMIN — Medication SCH MG: at 22:30

## 2021-12-27 LAB
ALBUMIN SERPL-MCNC: 3.4 G/DL (ref 3.4–5)
ALP SERPL-CCNC: 65 U/L (ref 45–117)
ALT SERPL-CCNC: 25 U/L (ref 13–61)
ANION GAP SERPL CALC-SCNC: 8 MMOL/L (ref 8–16)
AST SERPL-CCNC: 28 U/L (ref 15–37)
BILIRUB SERPL-MCNC: 0.7 MG/DL (ref 0.2–1)
BUN SERPL-MCNC: 16.5 MG/DL (ref 7–18)
CALCIUM SERPL-MCNC: 9.2 MG/DL (ref 8.5–10.1)
CHLORIDE SERPL-SCNC: 103 MMOL/L (ref 98–107)
CO2 SERPL-SCNC: 28 MMOL/L (ref 21–32)
CREAT SERPL-MCNC: 0.9 MG/DL (ref 0.55–1.3)
DEPRECATED RDW RBC AUTO: 17.8 % (ref 11.6–15.6)
GLUCOSE SERPL-MCNC: 149 MG/DL (ref 74–106)
HCT VFR BLD CALC: 34.7 % (ref 32.4–45.2)
HGB BLD-MCNC: 11.3 GM/DL (ref 10.7–15.3)
MCH RBC QN AUTO: 30.8 PG (ref 25.7–33.7)
MCHC RBC AUTO-ENTMCNC: 32.4 G/DL (ref 32–36)
MCV RBC: 95 FL (ref 80–96)
PLATELET # BLD AUTO: 265 10^3/UL (ref 134–434)
PMV BLD: 9.3 FL (ref 7.5–11.1)
PROT SERPL-MCNC: 6.7 G/DL (ref 6.4–8.2)
RBC # BLD AUTO: 3.66 M/MM3 (ref 3.6–5.2)
SODIUM SERPL-SCNC: 139 MMOL/L (ref 136–145)
WBC # BLD AUTO: 5.9 K/MM3 (ref 4–10)

## 2021-12-27 RX ADMIN — HYDROXYZINE PAMOATE PRN MG: 25 CAPSULE ORAL at 22:42

## 2021-12-27 RX ADMIN — Medication SCH MG: at 22:41

## 2021-12-27 RX ADMIN — Medication SCH TAB: at 10:25

## 2021-12-28 RX ADMIN — Medication SCH TAB: at 10:19

## 2021-12-28 RX ADMIN — Medication SCH MG: at 21:37

## 2021-12-28 RX ADMIN — VENLAFAXINE HYDROCHLORIDE SCH MG: 75 CAPSULE, EXTENDED RELEASE ORAL at 11:37

## 2021-12-29 RX ADMIN — Medication SCH TAB: at 10:40

## 2021-12-29 RX ADMIN — HYDROXYZINE PAMOATE PRN MG: 25 CAPSULE ORAL at 14:47

## 2021-12-29 RX ADMIN — HYDROXYZINE PAMOATE PRN MG: 25 CAPSULE ORAL at 22:04

## 2021-12-29 RX ADMIN — Medication SCH MG: at 22:06

## 2021-12-29 RX ADMIN — VENLAFAXINE HYDROCHLORIDE SCH MG: 75 CAPSULE, EXTENDED RELEASE ORAL at 10:40

## 2021-12-29 RX ADMIN — Medication SCH MG: at 22:07

## 2021-12-30 VITALS — SYSTOLIC BLOOD PRESSURE: 127 MMHG | TEMPERATURE: 97.3 F | DIASTOLIC BLOOD PRESSURE: 69 MMHG | HEART RATE: 88 BPM

## 2021-12-30 RX ADMIN — VENLAFAXINE HYDROCHLORIDE SCH MG: 75 CAPSULE, EXTENDED RELEASE ORAL at 09:16

## 2021-12-30 RX ADMIN — Medication SCH TAB: at 09:16
